# Patient Record
Sex: MALE | Race: BLACK OR AFRICAN AMERICAN | NOT HISPANIC OR LATINO | Employment: FULL TIME | URBAN - METROPOLITAN AREA
[De-identification: names, ages, dates, MRNs, and addresses within clinical notes are randomized per-mention and may not be internally consistent; named-entity substitution may affect disease eponyms.]

---

## 2017-03-17 ENCOUNTER — HOSPITAL ENCOUNTER (EMERGENCY)
Facility: HOSPITAL | Age: 27
Discharge: HOME | End: 2017-03-17
Attending: EMERGENCY MEDICINE

## 2017-03-17 VITALS
HEIGHT: 70 IN | SYSTOLIC BLOOD PRESSURE: 106 MMHG | TEMPERATURE: 98 F | RESPIRATION RATE: 16 BRPM | BODY MASS INDEX: 20.04 KG/M2 | HEART RATE: 77 BPM | WEIGHT: 140 LBS | DIASTOLIC BLOOD PRESSURE: 61 MMHG | OXYGEN SATURATION: 98 %

## 2017-03-17 DIAGNOSIS — B34.9 VIRAL SYNDROME: Primary | ICD-10-CM

## 2017-03-17 PROCEDURE — 99282 EMERGENCY DEPT VISIT SF MDM: CPT | Mod: ,,, | Performed by: PHYSICIAN ASSISTANT

## 2017-03-17 PROCEDURE — 99283 EMERGENCY DEPT VISIT LOW MDM: CPT

## 2017-03-17 NOTE — DISCHARGE INSTRUCTIONS
Rest.  Drink plenty of fluids.  Tylenol or Ibuprofen as directed as needed for fever/pain.  Return to the ED for any new or worsening symptoms.

## 2017-03-17 NOTE — ED PROVIDER NOTES
Encounter Date: 3/17/2017    SCRIBE #1 NOTE: I, Scarlett Desai, am scribing for, and in the presence of,  Dr. Mei. I have scribed the following portions of the note - the APC attestation.       History     Chief Complaint   Patient presents with    Generalized Body Aches     Review of patient's allergies indicates:  No Known Allergies  HPI Comments: Time seen by provider: 5:00 PM    Disclaimer: This note has been generated using voice-recognition software. There may be typographical errors that have been missed during proof-reading.    This is a 26-year-old black male with no significant past medical history presents the ER with a chief complaint of fever, chills, body aches, sinus congestion and cough.  Patient states his symptoms began approximately 4 days ago.  He has been using over-the-counter medications with some improvement of his symptoms.  Patient states he missed some work and is here for return to work excuse.    The history is provided by the patient.     Past Medical History:   Diagnosis Date    Ankle fracture     bilateral     Past Surgical History:   Procedure Laterality Date    NONE N/A 2/9/2016     Family History   Problem Relation Age of Onset    Hypertension Mother     Cancer Maternal Aunt     Diabetes Maternal Aunt     Hypertension Maternal Uncle     Diabetes Paternal Aunt     Heart disease Neg Hx     Stroke Neg Hx      Social History   Substance Use Topics    Smoking status: Current Every Day Smoker     Packs/day: 0.50     Years: 6.00     Types: Cigarettes    Smokeless tobacco: None    Alcohol use Yes      Comment: once per week     Review of Systems   Constitutional: Positive for chills and fever.   HENT: Positive for congestion.    Respiratory: Positive for cough. Negative for shortness of breath.    Cardiovascular: Negative for chest pain.   Gastrointestinal: Negative for nausea and vomiting.   Musculoskeletal: Positive for myalgias. Negative for back pain, neck pain and  neck stiffness.   Skin: Negative for rash and wound.   Neurological: Negative for weakness and numbness.   Psychiatric/Behavioral: Negative for confusion.       Physical Exam   Initial Vitals   BP Pulse Resp Temp SpO2   03/17/17 1502 03/17/17 1502 03/17/17 1502 03/17/17 1502 03/17/17 1502   106/61 77 16 98.3 °F (36.8 °C) 98 %     Physical Exam    Nursing note and vitals reviewed.  Constitutional: He appears well-developed and well-nourished. No distress.   HENT:   Head: Normocephalic and atraumatic.   Right Ear: External ear normal.   Left Ear: External ear normal.   Nose: Nose normal.   Eyes: Conjunctivae are normal.   Neck: Normal range of motion. Neck supple.   Cardiovascular: Normal rate and regular rhythm. Exam reveals no gallop and no friction rub.    No murmur heard.  Pulmonary/Chest: Breath sounds normal. He has no wheezes. He has no rhonchi. He has no rales.   Musculoskeletal: Normal range of motion.   Neurological: He is alert and oriented to person, place, and time.   Skin: Skin is warm and dry. No rash noted. No erythema.   Psychiatric: He has a normal mood and affect. His behavior is normal. Judgment and thought content normal.         ED Course   Procedures  Labs Reviewed - No data to display          Medical Decision Making:   History:   Old Medical Records: I decided to obtain old medical records.  ED Management:  Patient presented to the ER with complaint of fever, body aches, sinus congestion and myalgias that are improving for the last 4 days.  Patient states that he is really here for return to work note.  He has been using over-the-counter medications, which are helping.  He denies any new symptoms today.  Patient's exam is benign.  I do not feel workup is necessary in the emergency room.  He was discharged to continue taking over-the-counter medications as directed.  He has been instructed to followup with his PCP within the next week if symptoms not improving.  He should return to the ER for  any new or worsening symptoms.  This case was discussed with my supervising physician.            Scribe Attestation:   Scribe #1: I performed the above scribed service and the documentation accurately describes the services I performed. I attest to the accuracy of the note.    Attending Attestation:     Physician Attestation Statement for NP/PA:   I discussed this assessment and plan of this patient with the NP/PA, but I did not personally examine the patient. The face to face encounter was performed by the NP/PA.    Other NP/PA Attestation Additions:    History of Present Illness: 26 y.o. male with viral syndrome.          Physician Attestation for Scribe:  Physician Attestation Statement for Scribe #1: I, Dr. Mei, reviewed documentation, as scribed by Scarlett Desai in my presence, and it is both accurate and complete.                 ED Course     Clinical Impression:   The encounter diagnosis was Viral syndrome.          Jaylin Bui PA-C  03/17/17 2024

## 2017-03-17 NOTE — ED TRIAGE NOTES
Pt missed a couple of days of work with flu-like symptoms and is in the ER to get a note saying he's okay to return to work

## 2017-03-17 NOTE — ED AVS SNAPSHOT
[facility]  [address]  [facility] [id number]               Kaiden Purcell   3/17/2017  4:41 PM   ED    Description:  Male : 1990   Department:  [facility]           Your Care was Coordinated By:     Provider Role From To    Cecille Mei MD Attending Provider 17 [id number] --    Jaylin Bui PA-C Physician Assistant 17 [id number] --      Reason for Visit     Generalized Body Aches           Diagnoses this Visit        Comments    Viral syndrome    -  Primary       ED Disposition     ED Disposition Condition Comment    Discharge             To Do List           Follow-up Information     Follow up with [facility]. Schedule an appointment as soon as possible for a visit in 1 week.    Specialty:  Internal Medicine    Why:  As needed    Contact information:    [address]  [facility] [id number] [phone]    Additional information:    [facility]      [facility]     [facility] Care Line -  Assistance  Registered nurses in the [facility] provide clinical advisement, health education, appointment booking, and other advisory services.  Call for this free service at [phone].             Medications           Message regarding Medications     Verify the changes and/or additions to your medication regime listed below are the same as discussed with your clinician today.  If any of these changes or additions are incorrect, please notify your healthcare provider.             Verify that the below list of medications is an accurate representation of the medications you are currently taking.  If none reported, the list may be blank. If incorrect, please contact your healthcare provider. Carry this list with you in case of emergency.           Current Medications     ondansetron (ZOFRAN-ODT) 4 MG TbDL Take 1 tablet (4 mg total) by mouth every 8  "(eight) hours as needed.           Clinical Reference Information           Your Vitals Were     BP Pulse Temp Resp Height Weight    106/61 (BP Location: Right arm, Patient Position: Sitting) 77 98.3 °F (36.8 °C) (Oral) 16 5' 10" (1.778 m) 63.5 kg (140 lb)    SpO2 BMI             98% 20.09 kg/m2         Allergies as of 3/17/2017     No Known Allergies      Immunizations Administered on Date of Encounter - 3/17/2017     None      ED Micro, Lab, POCT     None      ED Imaging Orders     None        Discharge Instructions       Rest.  Drink plenty of fluids.  Tylenol or Ibuprofen as directed as needed for fever/pain.  Return to the ED for any new or worsening symptoms.    Discharge References/Attachments     VIRAL SYNDROME (ADULT) (ENGLISH)      [facility] Sign-Up     Activating your [facility] account is as easy as 1-2-3!     1) Visit [email or web address], select Sign Up Now, enter this activation code and your date of birth, then select Next.  [email or web address]  Expires: 5/1/2017  5:04 PM      2) Create a username and password to use when you visit [facility] in the future and select a security question in case you lose your password and select Next.    3) Enter your e-mail address and click Sign Up!    Additional Information  If you have questions, please e-mail [email or web address] or call [phone] to talk to our [facility] staff. Remember, [facility] is NOT to be used for urgent needs. For medical emergencies, dial 911.          [facility] complies with applicable Federal civil rights laws and does not discriminate on the basis of race, color, national origin, age, disability, or sex.        Language Assistance Services     ATTENTION: Language assistance services are available, free of charge. Please call [phone].      ATENCIÓN: Si habla español, tiene a mattson disposición servicios gratuitos de asistencia lingüística. Llame al [phone].     CHÚ Ý: N?u b?n nói Ti?ng Vi?t, có các d?ch v? h? " tr? yuri antony? mi?n phí dành cho b?n. G?i s? [phone].

## 2017-03-17 NOTE — ED NOTES
LOC: The patient is awake, alert and aware of environment with an appropriate affect, the patient is oriented x 3 and speaking appropriately.  APPEARANCE: Patient resting comfortably and in no acute distress, patient is clean and well groomed, patient's clothing is properly fastened.  SKIN: The skin is warm and dry, patient has normal skin turgor and moist mucus membranes, skin intact, no breakdown or brusing noted.  MUSKULOSKELETAL: Patient moving all extremities well, no obvious swelling or deformities noted.  RESPIRATORY: Airway is open and patent, respirations are spontaneous, patient has a normal effort and rate.   NEURO: No neuro deficits. Speech is clear.

## 2017-12-07 ENCOUNTER — HOSPITAL ENCOUNTER (EMERGENCY)
Facility: HOSPITAL | Age: 27
Discharge: HOME | End: 2017-12-07
Attending: FAMILY MEDICINE
Payer: COMMERCIAL

## 2017-12-07 VITALS
RESPIRATION RATE: 16 BRPM | HEIGHT: 70 IN | HEART RATE: 68 BPM | SYSTOLIC BLOOD PRESSURE: 155 MMHG | BODY MASS INDEX: 20.04 KG/M2 | WEIGHT: 140 LBS | TEMPERATURE: 98 F | OXYGEN SATURATION: 100 % | DIASTOLIC BLOOD PRESSURE: 68 MMHG

## 2017-12-07 DIAGNOSIS — A08.4 VIRAL GASTROENTERITIS: Primary | ICD-10-CM

## 2017-12-07 PROCEDURE — 99282 EMERGENCY DEPT VISIT SF MDM: CPT

## 2017-12-07 PROCEDURE — 99282 EMERGENCY DEPT VISIT SF MDM: CPT | Mod: ,,, | Performed by: FAMILY MEDICINE

## 2017-12-07 NOTE — ED PROVIDER NOTES
Encounter Date: 12/7/2017    SCRIBE #1 NOTE: I, Diana Cespedes, am scribing for, and in the presence of,  Dr. Alvarez. I have scribed the following portions of the note - Other sections scribed: HPI, ROS, and PE.       History     Chief Complaint   Patient presents with    Letter for School/Work     finished with nvd, needing work note     Time patient was seen by the provider: 3:23 PM      The patient is a 27 y.o. male with no past medical history that presents to the ED with a complaint of needing an excuse to return to work. He states two days ago he had a stomach virus and was unable to attend work. He is now feeling better and able to retain PO intake. Patient states that he is going back to work today. He denies fever or wanting any medications.       The history is provided by the patient.     Review of patient's allergies indicates:  No Known Allergies  Past Medical History:   Diagnosis Date    Ankle fracture     bilateral     Past Surgical History:   Procedure Laterality Date    NONE N/A 2/9/2016     Family History   Problem Relation Age of Onset    Hypertension Mother     Cancer Maternal Aunt     Diabetes Maternal Aunt     Hypertension Maternal Uncle     Diabetes Paternal Aunt     Heart disease Neg Hx     Stroke Neg Hx      Social History   Substance Use Topics    Smoking status: Current Every Day Smoker     Packs/day: 0.50     Years: 6.00     Types: Cigarettes    Smokeless tobacco: Not on file    Alcohol use Yes      Comment: once per week     Review of Systems   Constitutional: Negative for fever.       Physical Exam     Initial Vitals [12/07/17 1358]   BP Pulse Resp Temp SpO2   (!) 155/68 68 16 98.2 °F (36.8 °C) 100 %      MAP       97         Physical Exam    Constitutional: No distress.   HENT:   Mucous membranes moist.    Cardiovascular: Normal rate, regular rhythm and normal heart sounds.   Pulmonary/Chest: Breath sounds normal. No respiratory distress.   Neurological: He is alert and  oriented to person, place, and time.   No acute neurological deficits.          ED Course   Procedures  Labs Reviewed - No data to display          Medical Decision Making:   History:   Old Medical Records: I decided to obtain old medical records.  ED Management:  Patient recovered from viral gastroenteritis.  Stable for discharge                   ED Course      Clinical Impression:   The encounter diagnosis was Viral gastroenteritis.    Disposition:   Disposition: Discharged  Condition: Stable                        Vargas Alvarez MD  12/07/17 2270

## 2017-12-07 NOTE — ED TRIAGE NOTES
Patient states he needs a work excuse for the 5th due to stomach virus. States he was too sick to get here.

## 2017-12-07 NOTE — ED NOTES
Patient identifiers verified and correct for Mr Purcell  C/C: Work excuse  APPEARANCE: awake and alert in NAD.  SKIN: warm, dry and intact. No breakdown or bruising.  MUSCULOSKELETAL: Patient moving all extremities spontaneously, no obvious swelling or deformities noted. Ambulates independently.  RESPIRATORY: Denies shortness of breath.Respirations unlabored.   CARDIAC: Denies CP, 2+ distal pulses; no peripheral edema  ABDOMEN: S/ND/NT, Denies nausea  : voids spontaneously, denies difficulty  Neurologic: AAO x 4; follows commands equal strength in all extremities; denies numbness/tingling. Denies dizziness

## 2018-07-11 ENCOUNTER — HOSPITAL ENCOUNTER (EMERGENCY)
Facility: HOSPITAL | Age: 28
Discharge: HOME | End: 2018-07-11
Attending: EMERGENCY MEDICINE
Payer: COMMERCIAL

## 2018-07-11 VITALS
OXYGEN SATURATION: 100 % | TEMPERATURE: 98 F | HEART RATE: 53 BPM | BODY MASS INDEX: 20.04 KG/M2 | WEIGHT: 140 LBS | DIASTOLIC BLOOD PRESSURE: 56 MMHG | SYSTOLIC BLOOD PRESSURE: 122 MMHG | HEIGHT: 70 IN | RESPIRATION RATE: 17 BRPM

## 2018-07-11 DIAGNOSIS — R53.1 WEAKNESS: Primary | ICD-10-CM

## 2018-07-11 LAB
BUN SERPL-MCNC: 15 MG/DL (ref 6–30)
CHLORIDE SERPL-SCNC: 102 MMOL/L (ref 95–110)
CREAT SERPL-MCNC: 1.1 MG/DL (ref 0.5–1.4)
GLUCOSE SERPL-MCNC: 83 MG/DL (ref 70–110)
HCT VFR BLD CALC: 45 %PCV (ref 36–54)
POC IONIZED CALCIUM: 1.11 MMOL/L (ref 1.06–1.42)
POC TCO2 (MEASURED): 29 MMOL/L (ref 23–29)
POTASSIUM BLD-SCNC: 4.4 MMOL/L (ref 3.5–5.1)
SAMPLE: NORMAL
SODIUM BLD-SCNC: 140 MMOL/L (ref 136–145)

## 2018-07-11 PROCEDURE — 25000003 PHARM REV CODE 250: Performed by: EMERGENCY MEDICINE

## 2018-07-11 PROCEDURE — 96360 HYDRATION IV INFUSION INIT: CPT

## 2018-07-11 PROCEDURE — 99283 EMERGENCY DEPT VISIT LOW MDM: CPT | Mod: 25

## 2018-07-11 PROCEDURE — 99283 EMERGENCY DEPT VISIT LOW MDM: CPT | Mod: ,,, | Performed by: EMERGENCY MEDICINE

## 2018-07-11 RX ADMIN — SODIUM CHLORIDE 1000 ML: 0.9 INJECTION, SOLUTION INTRAVENOUS at 12:07

## 2018-07-11 NOTE — ED NOTES
Patient identifiers verified and correct for Mr Purcell  C/C: Gen weakness, diarrhea x 3 over 3 days  APPEARANCE: awake and alert in NAD.  SKIN: warm, dry and intact. No breakdown or bruising.  MUSCULOSKELETAL: Patient moving all extremities spontaneously, no obvious swelling or deformities noted. Ambulates independently.  RESPIRATORY: Denies shortness of breath.Respirations unlabored.   CARDIAC: Denies CP, 2+ distal pulses; no peripheral edema  ABDOMEN: S/ND/NT, Denies nausea, Diarrhea daily  : voids spontaneously, denies difficulty  Neurologic: AAO x 4; follows commands equal strength in all extremities; denies numbness/tingling. Denies dizziness Positive gen weakness, denies headache

## 2018-07-11 NOTE — ED TRIAGE NOTES
"Patient states he is "restless" and tired x 3 days, states same symptoms 1 year ago and was told he was "dehydrated" Denies vomiting, diarrhea x 3 over 3 days, Main concern of fatigue and inability to sleep.   "

## 2018-07-11 NOTE — ED PROVIDER NOTES
Encounter Date: 7/11/2018    SCRIBE #1 NOTE: I, Scarlett Desai, am scribing for, and in the presence of,  Dr. Ching. I have scribed the entire note.       History     Chief Complaint   Patient presents with    Dehydration     Pt presented to the ED via pov. Pt c/o deyhradation. Pt alert. 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 Pt states he needs fluids.      Time seen by provider: 11:54 AM    This is a 27 y.o. male with no medical problems who presents with complaint of generalized fatigue and SOB for the past 3 days. 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 He has had similar symptoms before and states he was dehydrated and needed IV fluids. He also endorses headache and diarrhea. He denies fever, chills, nausea, vomiting, chest pain, weight gain, and sick contact.       The history is provided by the patient and medical records.     Review of patient's allergies indicates:  No Known Allergies  Past Medical History:   Diagnosis Date    Ankle fracture     bilateral    Gastroenteritis      Past Surgical History:   Procedure Laterality Date    NONE N/A 2/9/2016     Family History   Problem Relation Age of Onset    Hypertension Mother     Cancer Maternal Aunt     Diabetes Maternal Aunt     Hypertension Maternal Uncle     Diabetes Paternal Aunt     Heart disease Neg Hx     Stroke Neg Hx      Social History   Substance Use Topics    Smoking status: Current Every Day Smoker     Packs/day: 0.50     Years: 6.00     Types: Cigarettes    Smokeless tobacco: Never Used    Alcohol use Yes      Comment: once per week, none recently     Review of Systems   Constitutional: Positive for fatigue. Negative for chills and fever.   HENT: Negative for nosebleeds.    Eyes: Negative for visual disturbance.   Respiratory: Positive for shortness of breath.    Cardiovascular: Negative for chest pain.   Gastrointestinal: Positive for diarrhea. Negative for nausea and  vomiting.   Genitourinary: Negative for dysuria.   Musculoskeletal: Negative for back pain.   Skin: Negative for rash.   Neurological: Positive for headaches.       Physical Exam     Initial Vitals [07/11/18 1117]   BP Pulse Resp Temp SpO2   116/65 67 17 98.4 °F (36.9 °C) 98 %      MAP       --         Physical Exam    Nursing note and vitals reviewed.  Constitutional: He appears well-developed and well-nourished. No distress.   HENT:   Head: Normocephalic and atraumatic.   Nose: Nose normal.   Mouth/Throat: Oropharynx is clear and moist.   Eyes: Conjunctivae and EOM are normal. Right eye exhibits no discharge. Left eye exhibits no discharge.   Neck: Normal range of motion. Neck supple. No JVD present.   Cardiovascular: Normal rate and normal heart sounds.   No murmur heard.  Pulmonary/Chest: No stridor. No respiratory distress. He exhibits no tenderness.   Abdominal: He exhibits no distension.   Musculoskeletal: Normal range of motion. He exhibits no edema or tenderness.   Lymphadenopathy:     He has no cervical adenopathy.   Neurological: He is alert and oriented to person, place, and time. He has normal strength. No cranial nerve deficit or sensory deficit.   Skin: Skin is warm and dry.   Psychiatric: He has a normal mood and affect. His behavior is normal.         ED Course   Procedures  Labs Reviewed   ISTAT PROCEDURE          Imaging Results    None          Medical Decision Making:   History:   Old Medical Records: I decided to obtain old medical records.  Clinical Tests:   Lab Tests: Ordered and Reviewed            Scribe Attestation:   Scribe #1: I performed the above scribed service and the documentation accurately describes the services I performed. I attest to the accuracy of the note.               Clinical Impression:   The encounter diagnosis was Weakness.      Disposition:   Disposition: Discharged  Condition: Stable                        Dudley Villatoro MD  07/15/18 6925

## 2018-11-10 ENCOUNTER — HOSPITAL ENCOUNTER (EMERGENCY)
Facility: HOSPITAL | Age: 28
Discharge: HOME | End: 2018-11-10
Attending: EMERGENCY MEDICINE

## 2018-11-10 VITALS
RESPIRATION RATE: 16 BRPM | SYSTOLIC BLOOD PRESSURE: 122 MMHG | HEIGHT: 70 IN | HEART RATE: 81 BPM | BODY MASS INDEX: 21.47 KG/M2 | DIASTOLIC BLOOD PRESSURE: 73 MMHG | TEMPERATURE: 99 F | WEIGHT: 150 LBS

## 2018-11-10 DIAGNOSIS — B34.9 VIRAL SYNDROME: Primary | ICD-10-CM

## 2018-11-10 PROCEDURE — 99283 EMERGENCY DEPT VISIT LOW MDM: CPT

## 2018-11-10 PROCEDURE — 99282 EMERGENCY DEPT VISIT SF MDM: CPT | Mod: ,,, | Performed by: EMERGENCY MEDICINE

## 2018-11-11 NOTE — DISCHARGE INSTRUCTIONS
Call one of the clinics below to schedule an appointment to obtain a primary care doctor.  Return to the emergency department if they have any new, recurring, or worrisome symptoms.

## 2018-11-11 NOTE — ED PROVIDER NOTES
Encounter Date: 11/10/2018    SCRIBE #1 NOTE: I, Kinjal Rollins, am scribing for, and in the presence of,  Dr Caballero. I have scribed the entire note.       History     Chief Complaint   Patient presents with    Fever     Time patient was seen by the provider: 6:57 PM      The patient is a 28 y.o. male with no known co-morbidities including: who presents to the ED with a complaint of moderate rhinorrhea. Pt notes that he had congestion, fatigue, and nausea 3 days ago which have now resolved. Pt states that he is not in any pain and is requesting a note for work. Denies fevers, CP, SOB, or abd pain. States his main reason to come in was for the note, that he feels fine now .         The history is provided by the patient.     Review of patient's allergies indicates:  No Known Allergies  Past Medical History:   Diagnosis Date    Ankle fracture     bilateral    Gastroenteritis      Past Surgical History:   Procedure Laterality Date    NONE N/A 2/9/2016     Family History   Problem Relation Age of Onset    Hypertension Mother     Cancer Maternal Aunt     Diabetes Maternal Aunt     Hypertension Maternal Uncle     Diabetes Paternal Aunt     Heart disease Neg Hx     Stroke Neg Hx      Social History     Tobacco Use    Smoking status: Current Every Day Smoker     Packs/day: 0.50     Years: 6.00     Pack years: 3.00     Types: Cigarettes    Smokeless tobacco: Never Used   Substance Use Topics    Alcohol use: Yes     Comment: once per week, none recently    Drug use: No     Comment: 5 cigarettes per day     Review of Systems     Constitutional:  No Fever, No Chills,   Eyes: No Vision Changes  ENT/Mouth: No sore throat. Rhinorrhea.   Cardiovascular:  No Chest Pain, No Palpitations  Respiratory:  No Cough, No SOB  Gastrointestinal:  No Nausea, No Vomiting, No Diarrhea, No abdo pain.  Genitourinary:  No  pain, No dysuria   Musculoskeletal:  No Arthralgias, No Back Pain, No Neck Pain, No recent trauma.  Skin:  No skin  Lesions  Neuro:  No Weakness, No Numbness, No Paresthesias, No Dizziness, No Headache        Physical Exam     Initial Vitals [11/10/18 1820]   BP Pulse Resp Temp SpO2   122/73 81 16 98.6 °F (37 °C) --      MAP       --         Physical Exam     Physical Exam:  GENERAL APPEARANCE: Well developed, well nourished, in no acute distress.  HENT: Normocephalic, atraumatic. Throat with mild erythema, no exudates. Tonsils not swollen. There is nasal congestion    EYES: Sclerae anicteric   NECK: Supple, no thyroid enlargement. Small cervical lymphadenopathy.   CARDIOVASCULAR: Regular rate and rhythm without any murmurs, gallops, rubs.  LUNGS: Speaking in full sentences. Breathing comfortably. Auscultation of the lungs revealed normal breath sounds b/l  ABDOMEN: Soft and nontender, no masses, no rebound or guarding   NEUROLOGIC: Alert, interacting normally. No facial droop.   MSK: Moving all four extremities.  Skin: Warm and dry. No visible rash on exposed areas of skin.    Psych: Mood and affect normal.       ED Course   Procedures  Labs Reviewed - No data to display       Imaging Results    None          Medical Decision Making:   History:   Old Medical Records: I decided to obtain old medical records.  Initial Assessment:   28-year-old male with no significant past medical history comes in with symptoms consistent with viral syndrome, resolving.  Exam is benign with some mild rhinorrhea mild throat erythema and small lymphadenopathy.   Differential Diagnosis:   URI, viral syndrome, common cold, pharyngitis  ED Management:  Patient states that his symptoms are resolved, that he would only like to note that he may return to work.  No provided say that he may return to work.  Risk of further workup or imaging at this time outweighs benefits.  Follow-up outpatient as needed.  ED return precautions for any new, worsening or worrisome symptoms.    MDM Complexity Points:   Problem Points:  New problem, with no additional work-up  planned (maximum of 1)    Data Points:  Decision to obtain old records (in the EHR)    Risk:  Minimal Risk              Scribe Attestation:   Scribe #1: I performed the above scribed service and the documentation accurately describes the services I performed. I attest to the accuracy of the note.               Clinical Impression:   The encounter diagnosis was Viral syndrome.      Disposition:   Disposition: Discharged  Condition: Stable                        Bill Caballero MD  11/11/18 1711

## 2018-11-11 NOTE — ED NOTES
LOC: The patient is awake, alert, and aware of environment. The patient is oriented x 3 and speaking appropriately.   APPEARANCE: No acute distress noted.   PSYCHOSOCIAL: Patient is calm and cooperative.   SKIN: The skin is warm, dry.   RESPIRATORY: Airway is open and patent. Bilateral chest rise and fall. Respirations are spontaneous, even and unlabored. Normal effort and rate noted. No accessory muscle use noted.   CARDIAC: Patient has a normal rate and rhythm. Denies chest pain or SOB.   ABDOMEN: Soft and non tender to palpation. No distention noted.   URINARY:  Voids independently.   EXTREMITIES: No swelling noted.   NEUROLOGIC: Eyes open spontaneously. Speech clear. Tolerating saliva secretions well. Able to follow commands, demonstrating ability to actively and appropriately communicate within context of current conversation. Symmetrical facial muscles. Moving all extremities well. Movement is purposeful.   MUSCULOSKELETAL: No obvious deformities noted.

## 2018-11-11 NOTE — ED TRIAGE NOTES
"Patient reports he was feeling "really really under the weather" last night, and so he called into work. Reports he is feeling better now and wants a work excuse.   "

## 2020-09-02 ENCOUNTER — HOSPITAL ENCOUNTER (OUTPATIENT)
Facility: HOSPITAL | Age: 30
Discharge: HOME | End: 2020-09-03
Attending: EMERGENCY MEDICINE | Admitting: SURGERY

## 2020-09-02 DIAGNOSIS — T71.193A ASSAULT BY MANUAL STRANGULATION: Primary | ICD-10-CM

## 2020-09-02 DIAGNOSIS — S01.81XA FACIAL LACERATION, INITIAL ENCOUNTER: ICD-10-CM
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
## 2020-09-02 DIAGNOSIS — S00.83XA CONTUSION OF FACE, INITIAL ENCOUNTER: ICD-10-CM

## 2020-09-02 LAB
ALBUMIN SERPL BCP-MCNC: 4.4 G/DL (ref 3.5–5.2)
ALP SERPL-CCNC: 101 U/L (ref 55–135)
ALT SERPL W/O P-5'-P-CCNC: 27 U/L (ref 10–44)
ANION GAP SERPL CALC-SCNC: 11 MMOL/L (ref 8–16)
AST SERPL-CCNC: 59 U/L (ref 10–40)
BASOPHILS # BLD AUTO: 0.06 K/UL (ref 0–0.2)
BASOPHILS NFR BLD: 0.4 % (ref 0–1.9)
BILIRUB SERPL-MCNC: 0.4 MG/DL (ref 0.1–1)
BUN SERPL-MCNC: 21 MG/DL (ref 6–20)
CALCIUM SERPL-MCNC: 9.6 MG/DL (ref 8.7–10.5)
CHLORIDE SERPL-SCNC: 106 MMOL/L (ref 95–110)
CK SERPL-CCNC: 3551 U/L (ref 20–200)
CO2 SERPL-SCNC: 23 MMOL/L (ref 23–29)
CREAT SERPL-MCNC: 1.4 MG/DL (ref 0.5–1.4)
DIFFERENTIAL METHOD: ABNORMAL
EOSINOPHIL # BLD AUTO: 0 K/UL (ref 0–0.5)
EOSINOPHIL NFR BLD: 0.3 % (ref 0–8)
ERYTHROCYTE [DISTWIDTH] IN BLOOD BY AUTOMATED COUNT: 12.1 % (ref 11.5–14.5)
EST. GFR  (AFRICAN AMERICAN): >60 ML/MIN/1.73 M^2
EST. GFR  (NON AFRICAN AMERICAN): >60 ML/MIN/1.73 M^2
GLUCOSE SERPL-MCNC: 106 MG/DL (ref 70–110)
HCT VFR BLD AUTO: 44.7 % (ref 40–54)
HGB BLD-MCNC: 14.7 G/DL (ref 14–18)
IMM GRANULOCYTES # BLD AUTO: 0.03 K/UL (ref 0–0.04)
IMM GRANULOCYTES NFR BLD AUTO: 0.2 % (ref 0–0.5)
LYMPHOCYTES # BLD AUTO: 1.8 K/UL (ref 1–4.8)
LYMPHOCYTES NFR BLD: 12.9 % (ref 18–48)
MCH RBC QN AUTO: 30.6 PG (ref 27–31)
MCHC RBC AUTO-ENTMCNC: 32.9 G/DL (ref 32–36)
MCV RBC AUTO: 93 FL (ref 82–98)
MONOCYTES # BLD AUTO: 0.9 K/UL (ref 0.3–1)
MONOCYTES NFR BLD: 6.6 % (ref 4–15)
NEUTROPHILS # BLD AUTO: 10.8 K/UL (ref 1.8–7.7)
NEUTROPHILS NFR BLD: 79.6 % (ref 38–73)
NRBC BLD-RTO: 0 /100 WBC
PLATELET # BLD AUTO: 204 K/UL (ref 150–350)
PMV BLD AUTO: 9.8 FL (ref 9.2–12.9)
POTASSIUM SERPL-SCNC: 3.7 MMOL/L (ref 3.5–5.1)
PROT SERPL-MCNC: 7.1 G/DL (ref 6–8.4)
RBC # BLD AUTO: 4.81 M/UL (ref 4.6–6.2)
SODIUM SERPL-SCNC: 140 MMOL/L (ref 136–145)
WBC # BLD AUTO: 13.55 K/UL (ref 3.9–12.7)

## 2020-09-02 PROCEDURE — 25000003 PHARM REV CODE 250: Performed by: STUDENT IN AN ORGANIZED HEALTH CARE EDUCATION/TRAINING PROGRAM

## 2020-09-02 PROCEDURE — 80047 BASIC METABLC PNL IONIZED CA: CPT

## 2020-09-02 PROCEDURE — 25500020 PHARM REV CODE 255: Performed by: EMERGENCY MEDICINE

## 2020-09-02 PROCEDURE — 80053 COMPREHEN METABOLIC PANEL: CPT

## 2020-09-02 PROCEDURE — 12011 PR RESUPERF WND FACE <2.5 CM: ICD-10-PCS | Mod: ,,, | Performed by: EMERGENCY MEDICINE

## 2020-09-02 PROCEDURE — 84100 ASSAY OF PHOSPHORUS: CPT

## 2020-09-02 PROCEDURE — 93005 ELECTROCARDIOGRAM TRACING: CPT

## 2020-09-02 PROCEDURE — 82550 ASSAY OF CK (CPK): CPT

## 2020-09-02 PROCEDURE — 93010 EKG 12-LEAD: ICD-10-PCS | Mod: ,,, | Performed by: INTERNAL MEDICINE

## 2020-09-02 PROCEDURE — 12011 RPR F/E/E/N/L/M 2.5 CM/<: CPT | Mod: ,,, | Performed by: EMERGENCY MEDICINE

## 2020-09-02 PROCEDURE — 93010 ELECTROCARDIOGRAM REPORT: CPT | Mod: ,,, | Performed by: INTERNAL MEDICINE

## 2020-09-02 PROCEDURE — 12011 RPR F/E/E/N/L/M 2.5 CM/<: CPT

## 2020-09-02 PROCEDURE — 99284 PR EMERGENCY DEPT VISIT,LEVEL IV: ICD-10-PCS | Mod: 25,,, | Performed by: EMERGENCY MEDICINE

## 2020-09-02 PROCEDURE — 90715 TDAP VACCINE 7 YRS/> IM: CPT | Performed by: EMERGENCY MEDICINE

## 2020-09-02 PROCEDURE — 63600175 PHARM REV CODE 636 W HCPCS: Performed by: EMERGENCY MEDICINE

## 2020-09-02 PROCEDURE — 99284 EMERGENCY DEPT VISIT MOD MDM: CPT | Mod: 25,,, | Performed by: EMERGENCY MEDICINE

## 2020-09-02 PROCEDURE — 99285 EMERGENCY DEPT VISIT HI MDM: CPT | Mod: 25

## 2020-09-02 PROCEDURE — 90471 IMMUNIZATION ADMIN: CPT | Performed by: EMERGENCY MEDICINE

## 2020-09-02 PROCEDURE — 96374 THER/PROPH/DIAG INJ IV PUSH: CPT

## 2020-09-02 PROCEDURE — 85025 COMPLETE CBC W/AUTO DIFF WBC: CPT

## 2020-09-02 PROCEDURE — 63600175 PHARM REV CODE 636 W HCPCS: Performed by: STUDENT IN AN ORGANIZED HEALTH CARE EDUCATION/TRAINING PROGRAM

## 2020-09-02 RX ORDER — LIDOCAINE HYDROCHLORIDE AND EPINEPHRINE 10; 10 MG/ML; UG/ML
10 INJECTION, SOLUTION INFILTRATION; PERINEURAL ONCE
Status: DISCONTINUED | OUTPATIENT
Start: 2020-09-03 | End: 2020-09-03

## 2020-09-02 RX ORDER — MORPHINE SULFATE 4 MG/ML
4 INJECTION, SOLUTION INTRAMUSCULAR; INTRAVENOUS
Status: COMPLETED | OUTPATIENT
Start: 2020-09-02 | End: 2020-09-02

## 2020-09-02 RX ADMIN — IOHEXOL 75 ML: 350 INJECTION, SOLUTION INTRAVENOUS at 10:09

## 2020-09-02 RX ADMIN — MORPHINE SULFATE 4 MG: 4 INJECTION INTRAVENOUS at 11:09

## 2020-09-02 RX ADMIN — CLOSTRIDIUM TETANI TOXOID ANTIGEN (FORMALDEHYDE INACTIVATED), CORYNEBACTERIUM DIPHTHERIAE TOXOID ANTIGEN (FORMALDEHYDE INACTIVATED), BORDETELLA PERTUSSIS TOXOID ANTIGEN (GLUTARALDEHYDE INACTIVATED), BORDETELLA PERTUSSIS FILAMENTOUS HEMAGGLUTININ ANTIGEN (FORMALDEHYDE INACTIVATED), BORDETELLA PERTUSSIS PERTACTIN ANTIGEN, AND BORDETELLA PERTUSSIS FIMBRIAE 2/3 ANTIGEN 0.5 ML: 5; 2; 2.5; 5; 3; 5 INJECTION, SUSPENSION INTRAMUSCULAR at 09:09

## 2020-09-02 RX ADMIN — SODIUM CHLORIDE 1000 ML: 0.9 INJECTION, SOLUTION INTRAVENOUS at 11:09

## 2020-09-03 VITALS
TEMPERATURE: 97 F | DIASTOLIC BLOOD PRESSURE: 64 MMHG | OXYGEN SATURATION: 100 % | HEART RATE: 53 BPM | WEIGHT: 140 LBS | BODY MASS INDEX: 19.6 KG/M2 | SYSTOLIC BLOOD PRESSURE: 108 MMHG | HEIGHT: 71 IN | RESPIRATION RATE: 16 BRPM
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
## 2020-09-03 LAB
ANION GAP SERPL CALC-SCNC: 8 MMOL/L (ref 8–16)
BASOPHILS # BLD AUTO: 0.05 K/UL (ref 0–0.2)
BASOPHILS NFR BLD: 0.4 % (ref 0–1.9)
BUN SERPL-MCNC: 15 MG/DL (ref 6–20)
BUN SERPL-MCNC: 29 MG/DL (ref 6–30)
CALCIUM SERPL-MCNC: 8.6 MG/DL (ref 8.7–10.5)
CHLORIDE SERPL-SCNC: 108 MMOL/L (ref 95–110)
CHLORIDE SERPL-SCNC: 110 MMOL/L (ref 95–110)
CK SERPL-CCNC: 4301 U/L (ref 20–200)
CO2 SERPL-SCNC: 22 MMOL/L (ref 23–29)
CREAT SERPL-MCNC: 1.1 MG/DL (ref 0.5–1.4)
CREAT SERPL-MCNC: 1.3 MG/DL (ref 0.5–1.4)
DIFFERENTIAL METHOD: ABNORMAL
EOSINOPHIL # BLD AUTO: 0.1 K/UL (ref 0–0.5)
EOSINOPHIL NFR BLD: 0.5 % (ref 0–8)
ERYTHROCYTE [DISTWIDTH] IN BLOOD BY AUTOMATED COUNT: 12.3 % (ref 11.5–14.5)
EST. GFR  (AFRICAN AMERICAN): >60 ML/MIN/1.73 M^2
EST. GFR  (NON AFRICAN AMERICAN): >60 ML/MIN/1.73 M^2
GLUCOSE SERPL-MCNC: 111 MG/DL (ref 70–110)
GLUCOSE SERPL-MCNC: 91 MG/DL (ref 70–110)
HCT VFR BLD AUTO: 42.8 % (ref 40–54)
HCT VFR BLD CALC: 45 %PCV (ref 36–54)
HGB BLD-MCNC: 13.7 G/DL (ref 14–18)
IMM GRANULOCYTES # BLD AUTO: 0.02 K/UL (ref 0–0.04)
IMM GRANULOCYTES NFR BLD AUTO: 0.2 % (ref 0–0.5)
LYMPHOCYTES # BLD AUTO: 2.2 K/UL (ref 1–4.8)
LYMPHOCYTES NFR BLD: 18.5 % (ref 18–48)
MAGNESIUM SERPL-MCNC: 2 MG/DL (ref 1.6–2.6)
MCH RBC QN AUTO: 30.2 PG (ref 27–31)
MCHC RBC AUTO-ENTMCNC: 32 G/DL (ref 32–36)
MCV RBC AUTO: 95 FL (ref 82–98)
MONOCYTES # BLD AUTO: 1 K/UL (ref 0.3–1)
MONOCYTES NFR BLD: 8.3 % (ref 4–15)
NEUTROPHILS # BLD AUTO: 8.6 K/UL (ref 1.8–7.7)
NEUTROPHILS NFR BLD: 72.1 % (ref 38–73)
NRBC BLD-RTO: 0 /100 WBC
PHOSPHATE SERPL-MCNC: 2.2 MG/DL (ref 2.7–4.5)
PLATELET # BLD AUTO: 196 K/UL (ref 150–350)
PMV BLD AUTO: 10.1 FL (ref 9.2–12.9)
POC IONIZED CALCIUM: 0.85 MMOL/L (ref 1.06–1.42)
POC TCO2 (MEASURED): 21 MMOL/L (ref 23–29)
POTASSIUM BLD-SCNC: >9 MMOL/L (ref 3.5–5.1)
POTASSIUM SERPL-SCNC: 3.8 MMOL/L (ref 3.5–5.1)
RBC # BLD AUTO: 4.53 M/UL (ref 4.6–6.2)
SAMPLE: ABNORMAL
SARS-COV-2 RDRP RESP QL NAA+PROBE: NEGATIVE
SODIUM BLD-SCNC: 131 MMOL/L (ref 136–145)
SODIUM SERPL-SCNC: 140 MMOL/L (ref 136–145)
WBC # BLD AUTO: 11.93 K/UL (ref 3.9–12.7)

## 2020-09-03 PROCEDURE — G0378 HOSPITAL OBSERVATION PER HR: HCPCS

## 2020-09-03 PROCEDURE — 99219 PR INITIAL OBSERVATION CARE,LEVL II: CPT | Mod: ,,, | Performed by: SURGERY

## 2020-09-03 PROCEDURE — 99219 PR INITIAL OBSERVATION CARE,LEVL II: ICD-10-PCS | Mod: ,,, | Performed by: SURGERY

## 2020-09-03 PROCEDURE — 25000003 PHARM REV CODE 250: Performed by: STUDENT IN AN ORGANIZED HEALTH CARE EDUCATION/TRAINING PROGRAM

## 2020-09-03 PROCEDURE — 96376 TX/PRO/DX INJ SAME DRUG ADON: CPT

## 2020-09-03 PROCEDURE — 63600175 PHARM REV CODE 636 W HCPCS: Performed by: EMERGENCY MEDICINE

## 2020-09-03 PROCEDURE — U0002 COVID-19 LAB TEST NON-CDC: HCPCS

## 2020-09-03 PROCEDURE — 82550 ASSAY OF CK (CPK): CPT

## 2020-09-03 PROCEDURE — 85025 COMPLETE CBC W/AUTO DIFF WBC: CPT

## 2020-09-03 PROCEDURE — 80048 BASIC METABOLIC PNL TOTAL CA: CPT

## 2020-09-03 PROCEDURE — 83735 ASSAY OF MAGNESIUM: CPT

## 2020-09-03 PROCEDURE — 25000003 PHARM REV CODE 250: Performed by: EMERGENCY MEDICINE

## 2020-09-03 RX ORDER — LIDOCAINE HYDROCHLORIDE AND EPINEPHRINE 10; 10 MG/ML; UG/ML
10 INJECTION, SOLUTION INFILTRATION; PERINEURAL ONCE
Status: COMPLETED | OUTPATIENT
Start: 2020-09-03 | End: 2020-09-03

## 2020-09-03 RX ORDER — ONDANSETRON 8 MG/1
8 TABLET, ORALLY DISINTEGRATING ORAL EVERY 8 HOURS PRN
Status: DISCONTINUED | OUTPATIENT
Start: 2020-09-03 | End: 2020-09-03 | Stop reason: HOSPADM

## 2020-09-03 RX ORDER — TRAMADOL HYDROCHLORIDE 50 MG/1
50 TABLET ORAL EVERY 6 HOURS PRN
Qty: 15 EACH | Refills: 0 | Status: SHIPPED | OUTPATIENT
Start: 2020-09-03 | End: 2022-09-27 | Stop reason: ALTCHOICE

## 2020-09-03 RX ORDER — OXYCODONE HYDROCHLORIDE 5 MG/1
5 TABLET ORAL EVERY 4 HOURS PRN
Status: DISCONTINUED | OUTPATIENT
Start: 2020-09-03 | End: 2020-09-03 | Stop reason: HOSPADM

## 2020-09-03 RX ORDER — SODIUM CHLORIDE 9 MG/ML
INJECTION, SOLUTION INTRAVENOUS CONTINUOUS
Status: DISCONTINUED | OUTPATIENT
Start: 2020-09-03 | End: 2020-09-03

## 2020-09-03 RX ORDER — ACETAMINOPHEN 500 MG
1000 TABLET ORAL EVERY 8 HOURS
Qty: 40 TABLET | Refills: 0 | Status: SHIPPED | OUTPATIENT
Start: 2020-09-03 | End: 2020-09-17

## 2020-09-03 RX ORDER — ACETAMINOPHEN 500 MG
1000 TABLET ORAL EVERY 8 HOURS
Status: DISCONTINUED | OUTPATIENT
Start: 2020-09-03 | End: 2020-09-03 | Stop reason: HOSPADM

## 2020-09-03 RX ORDER — MORPHINE SULFATE 4 MG/ML
4 INJECTION, SOLUTION INTRAMUSCULAR; INTRAVENOUS
Status: COMPLETED | OUTPATIENT
Start: 2020-09-03 | End: 2020-09-03

## 2020-09-03 RX ORDER — LIDOCAINE HYDROCHLORIDE 10 MG/ML
1 INJECTION, SOLUTION EPIDURAL; INFILTRATION; INTRACAUDAL; PERINEURAL ONCE
Status: DISCONTINUED | OUTPATIENT
Start: 2020-09-03 | End: 2020-09-03 | Stop reason: HOSPADM

## 2020-09-03 RX ADMIN — LIDOCAINE HYDROCHLORIDE,EPINEPHRINE BITARTRATE 10 ML: 10; .01 INJECTION, SOLUTION INFILTRATION; PERINEURAL at 12:09

## 2020-09-03 RX ADMIN — OXYCODONE 5 MG: 5 TABLET ORAL at 11:09

## 2020-09-03 RX ADMIN — MORPHINE SULFATE 4 MG: 4 INJECTION INTRAVENOUS at 12:09

## 2020-09-03 RX ADMIN — SODIUM CHLORIDE: 0.9 INJECTION, SOLUTION INTRAVENOUS at 03:09
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
## 2020-09-03 NOTE — PLAN OF CARE
SW completed discharge planning assessment with pt's girl friend at bedside. Patient was sleeping upon assessment. SW verified demographic information listed on the pt.'s Face sheet. SW updated pt's Primary phone #[phone].     As reported pt's does not have a PCP, or insurance. SW will follow up with the pt as it relates to his discharge needs.       09/03/20 0934   Discharge Assessment   Assessment Type Discharge Planning Assessment   Confirmed/corrected address and phone number on facesheet? Yes   Assessment information obtained from? Other  (Pt's Girlfriend)   Expected Length of Stay (days) 1   Communicated expected length of stay with patient/caregiver yes   Prior to hospitilization cognitive status: Alert/Oriented   Prior to hospitalization functional status: Independent   Current cognitive status: Alert/Oriented   Current Functional Status: Independent   Facility Arrived From: N/A   Lives With significant other   Able to Return to Prior Arrangements yes   Is patient able to care for self after discharge? Yes   Who are your caregiver(s) and their phone number(s)? N/A   Patient's perception of discharge disposition home or selfcare   Readmission Within the Last 30 Days no previous admission in last 30 days   Patient currently being followed by outpatient case management? No   Patient currently receives any other outside agency services? No   Equipment Currently Used at Home none   Do you have any problems affording any of your prescribed medications? No   Is the patient taking medications as prescribed? yes   Does the patient have transportation home? Yes   Transportation Anticipated family or friend will provide  (pt's Girlfrient at bedside)   Does the patient receive services at the [facility]? No   Discharge Plan A Home with family   Discharge Plan B Home   DME Needed Upon Discharge  none   Patient/Family in Agreement with Plan yes     Carmina Han LMSW  Case Management   [facility]   Ext. [phone]
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
## 2020-09-03 NOTE — NURSING
Pt oriented to room and unit. Bed in lowest position with siderails up x2. Call bell within reach; pt instructed to call for assistance. Covered both of pts knees and elbows with foam dressing. Started IV fluids. Pt updated with POC.  Will continue to monitor.

## 2020-09-03 NOTE — ED NOTES
I-STAT Chem-8+ Results:   Value Reference Range   Sodium 131 136-145 mmol/L   Potassium  9??? 3.5-5.1 mmol/L   Chloride 108  mmol/L   Ionized Calcium 0.85 1.06-1.42 mmol/L   CO2 (measured) 21 23-29 mmol/L   Glucose 111  mg/dL   BUN 29 6-30 mg/dL   Creatinine 1.3 0.5-1.4 mg/dL   Hematocrit 45 36-54%      Provider notified of results
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
## 2020-09-03 NOTE — NURSING
Pt c/o 8/10 pain and is requesting a stronger prescription pain medication to d/c home with, MD shaw. Pt received and verbalized understanding of AVS and IV has been removed.
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
## 2020-09-03 NOTE — PLAN OF CARE
09/03/20 1540   Final Note   Assessment Type Final Discharge Note   Anticipated Discharge Disposition Home   What phone number can be called within the next 1-3 days to see how you are doing after discharge? [phone]   Discharge plans and expectations educations in teach back method with documentation complete? Yes     Follow-up Information      Shiraz Mirza MD.    Specialty: General Surgery  Why: As needed  Contact information:  [address]  [facility] [id number] [phone]

## 2020-09-03 NOTE — SUBJECTIVE & OBJECTIVE
No current facility-administered medications on file prior to encounter.      No current outpatient medications on file prior to encounter.       Review of patient's allergies indicates:  No Known Allergies    Past Medical History:   Diagnosis Date    Ankle fracture     bilateral    Gastroenteritis      Past Surgical History:   Procedure Laterality Date    NONE N/A 2/9/2016     Family History     Problem Relation (Age of Onset)    Cancer Maternal Aunt    Diabetes Maternal Aunt, Paternal Aunt    Hypertension Mother, Maternal Uncle        Tobacco Use    Smoking status: Current Every Day Smoker     Packs/day: 0.50     Years: 6.00     Pack years: 3.00     Types: Cigarettes    Smokeless tobacco: Never Used   Substance and Sexual Activity    Alcohol use: Not Currently     Comment: once per week, none recently    Drug use: No     Comment: 5 cigarettes per day    Sexual activity: Not on file     Review of Systems   Constitutional: Negative for activity change, appetite change, chills, fatigue and fever.   HENT: Negative for congestion, ear pain, rhinorrhea and sore throat.    Eyes: Negative for pain, discharge and visual disturbance.   Respiratory: Negative for cough, chest tightness and shortness of breath.    Cardiovascular: Negative for chest pain and palpitations.   Gastrointestinal: Negative for abdominal distention, abdominal pain, blood in stool, constipation, diarrhea, nausea and vomiting.   Genitourinary: Negative for difficulty urinating.   Musculoskeletal: Negative for back pain and neck pain.   Skin: Positive for wound. Negative for color change and rash.   Neurological: Positive for weakness (L>R). Negative for dizziness, tremors, facial asymmetry, numbness and headaches.   Hematological: Negative for adenopathy.   Psychiatric/Behavioral: Negative.      Objective:     Vital Signs (Most Recent):  Temp: 98.8 °F (37.1 °C) (09/02/20 2025)  Pulse: 82 (09/02/20 2057)  Resp: 17 (09/02/20 2057)  BP: 130/87  (09/02/20 2057)  SpO2: 100 % (09/02/20 2057) Vital Signs (24h Range):  Temp:  [98.8 °F (37.1 °C)] 98.8 °F (37.1 °C)  Pulse:  [82-90] 82  Resp:  [16-17] 17  SpO2:  [99 %-100 %] 100 %  BP: (124-130)/(80-87) 130/87     Weight: 68 kg (150 lb)  Body mass index is 21.52 kg/m².    Physical Exam  Constitutional:       General: He is not in acute distress.     Appearance: He is well-developed. He is not toxic-appearing.   HENT:      Head: Normocephalic. Abrasion (multiple), contusion and laceration present. No raccoon eyes.      Jaw: No pain on movement.      Comments: Multiple small abrasions  Right supra-orbital swelling and abrasion  Left zygomatic tenderness and swelling      Nose: Nose normal.   Eyes:      General: No scleral icterus.        Right eye: No discharge.         Left eye: No discharge.      Conjunctiva/sclera: Conjunctivae normal.      Pupils: Pupils are equal, round, and reactive to light.   Neck:      Thyroid: No thyromegaly.      Comments: C-collar in place   Cardiovascular:      Rate and Rhythm: Normal rate and regular rhythm.   Pulmonary:      Effort: Pulmonary effort is normal. No respiratory distress.   Abdominal:      General: There is no distension.      Palpations: Abdomen is soft.      Tenderness: There is no abdominal tenderness. There is no guarding or rebound.   Skin:     General: Skin is warm and dry.      Findings: Bruising and lesion present. No rash.   Neurological:      Mental Status: He is alert and oriented to person, place, and time.      Sensory: No sensory deficit.      Motor: Weakness present.      Comments: Left sided weakness 3/5 strength in upper extremity and 4/5 in lower extremity  Right sided weakness per report but 5/5 strength in upper and lower extremities         Significant Labs:  CBC:   Recent Labs   Lab 09/02/20 2106   WBC 13.55*   RBC 4.81   HGB 14.7   HCT 44.7      MCV 93   MCH 30.6   MCHC 32.9     BMP:   Recent Labs   Lab 09/02/20 2106         K  3.7      CO2 23   BUN 21*   CREATININE 1.4   CALCIUM 9.6       Significant Diagnostics:  I have reviewed all pertinent imaging results/findings within the past 24 hours.     CT head, max/fac and CTA neck pending

## 2020-09-04 NOTE — NURSING
Pt verbalized understand of AVS and stated that he wanted to walk out instead of waiting on wheelchair. Pt aware of x prescriptions that need to be picked up from pharmacy.

## 2020-11-16 ENCOUNTER — HOSPITAL ENCOUNTER (EMERGENCY)
Facility: HOSPITAL | Age: 30
Discharge: HOME | End: 2020-11-16
Attending: EMERGENCY MEDICINE
Payer: COMMERCIAL

## 2020-11-16 VITALS
WEIGHT: 140 LBS | DIASTOLIC BLOOD PRESSURE: 81 MMHG | BODY MASS INDEX: 20.04 KG/M2 | RESPIRATION RATE: 16 BRPM | HEART RATE: 63 BPM | OXYGEN SATURATION: 99 % | SYSTOLIC BLOOD PRESSURE: 122 MMHG | HEIGHT: 70 IN | TEMPERATURE: 98 F

## 2020-11-16 DIAGNOSIS — S39.012A STRAIN OF LUMBAR REGION, INITIAL ENCOUNTER: ICD-10-CM
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
## 2020-11-16 PROCEDURE — 25000003 PHARM REV CODE 250: Performed by: EMERGENCY MEDICINE

## 2020-11-16 PROCEDURE — 99284 PR EMERGENCY DEPT VISIT,LEVEL IV: ICD-10-PCS | Mod: ,,, | Performed by: EMERGENCY MEDICINE

## 2020-11-16 PROCEDURE — 99284 EMERGENCY DEPT VISIT MOD MDM: CPT | Mod: ,,, | Performed by: EMERGENCY MEDICINE

## 2020-11-16 PROCEDURE — 99284 EMERGENCY DEPT VISIT MOD MDM: CPT

## 2020-11-16 RX ORDER — METHOCARBAMOL 500 MG/1
500 TABLET, FILM COATED ORAL 3 TIMES DAILY PRN
Qty: 15 TABLET | Refills: 0 | Status: SHIPPED | OUTPATIENT
Start: 2020-11-16 | End: 2020-11-21

## 2020-11-16 RX ORDER — KETOROLAC TROMETHAMINE 10 MG/1
10 TABLET, FILM COATED ORAL
Status: COMPLETED | OUTPATIENT
Start: 2020-11-16 | End: 2020-11-16

## 2020-11-16 RX ORDER — NAPROXEN 375 MG/1
375 TABLET ORAL 2 TIMES DAILY WITH MEALS
Qty: 10 TABLET | Refills: 0 | Status: SHIPPED | OUTPATIENT
Start: 2020-11-16 | End: 2020-11-21

## 2020-11-16 RX ADMIN — KETOROLAC TROMETHAMINE 10 MG: 10 TABLET, FILM COATED ORAL at 08:11

## 2020-11-16 NOTE — Clinical Note
"Kaiden Jerniganduarte Purcell was seen and treated in our emergency department on 11/16/2020.  He may return to work on 11/18/2020.       If you have any questions or concerns, please don't hesitate to call.      Blaine Alcantara, DO"
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
## 2021-01-11 ENCOUNTER — HOSPITAL ENCOUNTER (EMERGENCY)
Facility: HOSPITAL | Age: 31
Discharge: HOME | End: 2021-01-11
Attending: EMERGENCY MEDICINE
Payer: COMMERCIAL

## 2021-01-11 VITALS
TEMPERATURE: 99 F | HEIGHT: 71 IN | RESPIRATION RATE: 18 BRPM | SYSTOLIC BLOOD PRESSURE: 120 MMHG | DIASTOLIC BLOOD PRESSURE: 63 MMHG | OXYGEN SATURATION: 100 % | WEIGHT: 145 LBS | BODY MASS INDEX: 20.3 KG/M2 | HEART RATE: 70 BPM

## 2021-01-11 DIAGNOSIS — K08.89 PAIN, DENTAL: Primary | ICD-10-CM

## 2021-01-11 DIAGNOSIS — R14.0 BLOATING: ICD-10-CM

## 2021-01-11 DIAGNOSIS — G44.309 POST-TRAUMATIC HEADACHE, NOT INTRACTABLE, UNSPECIFIED CHRONICITY PATTERN: ICD-10-CM

## 2021-01-11 LAB
CTP QC/QA: YES
SARS-COV-2 RDRP RESP QL NAA+PROBE: NEGATIVE

## 2021-01-11 PROCEDURE — 99284 EMERGENCY DEPT VISIT MOD MDM: CPT | Mod: 25

## 2021-01-11 PROCEDURE — U0002 COVID-19 LAB TEST NON-CDC: HCPCS | Performed by: EMERGENCY MEDICINE

## 2021-01-11 PROCEDURE — 93010 ELECTROCARDIOGRAM REPORT: CPT | Mod: ,,, | Performed by: INTERNAL MEDICINE

## 2021-01-11 PROCEDURE — 93010 EKG 12-LEAD: ICD-10-PCS | Mod: ,,, | Performed by: INTERNAL MEDICINE

## 2021-01-11 PROCEDURE — 25000003 PHARM REV CODE 250: Performed by: EMERGENCY MEDICINE

## 2021-01-11 PROCEDURE — 99284 PR EMERGENCY DEPT VISIT,LEVEL IV: ICD-10-PCS | Mod: CS,,, | Performed by: EMERGENCY MEDICINE

## 2021-01-11 PROCEDURE — 93005 ELECTROCARDIOGRAM TRACING: CPT

## 2021-01-11 PROCEDURE — 99284 EMERGENCY DEPT VISIT MOD MDM: CPT | Mod: CS,,, | Performed by: EMERGENCY MEDICINE

## 2021-01-11 RX ORDER — CHLORHEXIDINE GLUCONATE ORAL RINSE 1.2 MG/ML
15 SOLUTION DENTAL 2 TIMES DAILY
Qty: 420 ML | Refills: 0 | Status: SHIPPED | OUTPATIENT
Start: 2021-01-11 | End: 2021-01-25

## 2021-01-11 RX ORDER — ACETAMINOPHEN 500 MG
500 TABLET ORAL
Qty: 42 TABLET | Refills: 0 | Status: SHIPPED | OUTPATIENT
Start: 2021-01-11 | End: 2021-01-18

## 2021-01-11 RX ORDER — ACETAMINOPHEN 500 MG
1000 TABLET ORAL
Status: COMPLETED | OUTPATIENT
Start: 2021-01-11 | End: 2021-01-11

## 2021-01-11 RX ORDER — IBUPROFEN 400 MG/1
400 TABLET ORAL EVERY 6 HOURS PRN
Qty: 28 TABLET | Refills: 0 | Status: SHIPPED | OUTPATIENT
Start: 2021-01-11 | End: 2021-01-18

## 2021-01-11 RX ORDER — PENICILLIN V POTASSIUM 500 MG/1
500 TABLET, FILM COATED ORAL 4 TIMES DAILY
Qty: 40 TABLET | Refills: 0 | Status: SHIPPED | OUTPATIENT
Start: 2021-01-11 | End: 2021-01-18

## 2021-01-11 RX ADMIN — ACETAMINOPHEN 1000 MG: 500 TABLET ORAL at 09:01

## 2021-04-11 ENCOUNTER — HOSPITAL ENCOUNTER (OUTPATIENT)
Facility: HOSPITAL | Age: 31
Discharge: HOME | End: 2021-04-12
Attending: EMERGENCY MEDICINE | Admitting: SURGERY

## 2021-04-11 DIAGNOSIS — R00.1 BRADYCARDIA: ICD-10-CM

## 2021-04-11 DIAGNOSIS — S71.131A GUN SHOT WOUND OF THIGH/FEMUR, RIGHT, INITIAL ENCOUNTER: Primary | ICD-10-CM
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
## 2021-04-11 LAB
ABO + RH BLD: NORMAL
ALBUMIN SERPL BCP-MCNC: 4.3 G/DL (ref 3.5–5.2)
ALP SERPL-CCNC: 101 U/L (ref 55–135)
ALT SERPL W/O P-5'-P-CCNC: 17 U/L (ref 10–44)
ANION GAP SERPL CALC-SCNC: 18 MMOL/L (ref 8–16)
AST SERPL-CCNC: 23 U/L (ref 10–40)
BASOPHILS # BLD AUTO: 0.07 K/UL (ref 0–0.2)
BASOPHILS NFR BLD: 0.5 % (ref 0–1.9)
BILIRUB SERPL-MCNC: 0.5 MG/DL (ref 0.1–1)
BLD GP AB SCN CELLS X3 SERPL QL: NORMAL
BUN SERPL-MCNC: 14 MG/DL (ref 6–20)
CALCIUM SERPL-MCNC: 9.6 MG/DL (ref 8.7–10.5)
CHLORIDE SERPL-SCNC: 105 MMOL/L (ref 95–110)
CO2 SERPL-SCNC: 19 MMOL/L (ref 23–29)
CREAT SERPL-MCNC: 1.3 MG/DL (ref 0.5–1.4)
CTP QC/QA: YES
DIFFERENTIAL METHOD: ABNORMAL
EOSINOPHIL # BLD AUTO: 0 K/UL (ref 0–0.5)
EOSINOPHIL NFR BLD: 0.2 % (ref 0–8)
ERYTHROCYTE [DISTWIDTH] IN BLOOD BY AUTOMATED COUNT: 11.8 % (ref 11.5–14.5)
EST. GFR  (AFRICAN AMERICAN): >60 ML/MIN/1.73 M^2
EST. GFR  (NON AFRICAN AMERICAN): >60 ML/MIN/1.73 M^2
ETHANOL SERPL-MCNC: 24 MG/DL
GLUCOSE SERPL-MCNC: 96 MG/DL (ref 70–110)
HCT VFR BLD AUTO: 44.9 % (ref 40–54)
HGB BLD-MCNC: 15.2 G/DL (ref 14–18)
IMM GRANULOCYTES # BLD AUTO: 0.03 K/UL (ref 0–0.04)
IMM GRANULOCYTES NFR BLD AUTO: 0.2 % (ref 0–0.5)
INR PPP: 1 (ref 0.8–1.2)
LYMPHOCYTES # BLD AUTO: 3.8 K/UL (ref 1–4.8)
LYMPHOCYTES NFR BLD: 29.2 % (ref 18–48)
MCH RBC QN AUTO: 31 PG (ref 27–31)
MCHC RBC AUTO-ENTMCNC: 33.9 G/DL (ref 32–36)
MCV RBC AUTO: 92 FL (ref 82–98)
MONOCYTES # BLD AUTO: 1 K/UL (ref 0.3–1)
MONOCYTES NFR BLD: 7.5 % (ref 4–15)
NEUTROPHILS # BLD AUTO: 8 K/UL (ref 1.8–7.7)
NEUTROPHILS NFR BLD: 62.4 % (ref 38–73)
NRBC BLD-RTO: 0 /100 WBC
PLATELET # BLD AUTO: 229 K/UL (ref 150–450)
PMV BLD AUTO: 10.2 FL (ref 9.2–12.9)
POTASSIUM SERPL-SCNC: 3.9 MMOL/L (ref 3.5–5.1)
PROT SERPL-MCNC: 7.8 G/DL (ref 6–8.4)
PROTHROMBIN TIME: 10.8 SEC (ref 9–12.5)
RBC # BLD AUTO: 4.9 M/UL (ref 4.6–6.2)
SARS-COV-2 RDRP RESP QL NAA+PROBE: NEGATIVE
SODIUM SERPL-SCNC: 142 MMOL/L (ref 136–145)
WBC # BLD AUTO: 12.86 K/UL (ref 3.9–12.7)

## 2021-04-11 PROCEDURE — 86703 HIV-1/HIV-2 1 RESULT ANTBDY: CPT | Performed by: EMERGENCY MEDICINE

## 2021-04-11 PROCEDURE — 63600175 PHARM REV CODE 636 W HCPCS: Performed by: STUDENT IN AN ORGANIZED HEALTH CARE EDUCATION/TRAINING PROGRAM

## 2021-04-11 PROCEDURE — 96374 THER/PROPH/DIAG INJ IV PUSH: CPT

## 2021-04-11 PROCEDURE — G0378 HOSPITAL OBSERVATION PER HR: HCPCS

## 2021-04-11 PROCEDURE — 85025 COMPLETE CBC W/AUTO DIFF WBC: CPT | Performed by: EMERGENCY MEDICINE

## 2021-04-11 PROCEDURE — 96361 HYDRATE IV INFUSION ADD-ON: CPT

## 2021-04-11 PROCEDURE — 82077 ASSAY SPEC XCP UR&BREATH IA: CPT | Performed by: EMERGENCY MEDICINE

## 2021-04-11 PROCEDURE — 99291 PR CRITICAL CARE, E/M 30-74 MINUTES: ICD-10-PCS | Mod: ,,, | Performed by: EMERGENCY MEDICINE

## 2021-04-11 PROCEDURE — 80053 COMPREHEN METABOLIC PANEL: CPT | Performed by: EMERGENCY MEDICINE

## 2021-04-11 PROCEDURE — 85610 PROTHROMBIN TIME: CPT | Performed by: EMERGENCY MEDICINE

## 2021-04-11 PROCEDURE — 63600175 PHARM REV CODE 636 W HCPCS: Performed by: EMERGENCY MEDICINE

## 2021-04-11 PROCEDURE — 99285 EMERGENCY DEPT VISIT HI MDM: CPT | Mod: 25

## 2021-04-11 PROCEDURE — 25000003 PHARM REV CODE 250: Performed by: EMERGENCY MEDICINE

## 2021-04-11 PROCEDURE — 86803 HEPATITIS C AB TEST: CPT | Performed by: EMERGENCY MEDICINE

## 2021-04-11 PROCEDURE — 99291 CRITICAL CARE FIRST HOUR: CPT | Mod: ,,, | Performed by: EMERGENCY MEDICINE

## 2021-04-11 PROCEDURE — 25500020 PHARM REV CODE 255: Performed by: EMERGENCY MEDICINE

## 2021-04-11 PROCEDURE — 63600175 PHARM REV CODE 636 W HCPCS: Performed by: PHYSICIAN ASSISTANT

## 2021-04-11 PROCEDURE — 96375 TX/PRO/DX INJ NEW DRUG ADDON: CPT | Performed by: EMERGENCY MEDICINE

## 2021-04-11 PROCEDURE — U0002 COVID-19 LAB TEST NON-CDC: HCPCS | Performed by: PHYSICIAN ASSISTANT

## 2021-04-11 PROCEDURE — 96375 TX/PRO/DX INJ NEW DRUG ADDON: CPT

## 2021-04-11 PROCEDURE — 86900 BLOOD TYPING SEROLOGIC ABO: CPT | Performed by: EMERGENCY MEDICINE

## 2021-04-11 RX ORDER — CEFAZOLIN SODIUM 1 G/3ML
1 INJECTION, POWDER, FOR SOLUTION INTRAMUSCULAR; INTRAVENOUS
Status: DISCONTINUED | OUTPATIENT
Start: 2021-04-12 | End: 2021-04-12 | Stop reason: HOSPADM

## 2021-04-11 RX ORDER — SODIUM CHLORIDE, SODIUM LACTATE, POTASSIUM CHLORIDE, CALCIUM CHLORIDE 600; 310; 30; 20 MG/100ML; MG/100ML; MG/100ML; MG/100ML
INJECTION, SOLUTION INTRAVENOUS CONTINUOUS
Status: DISCONTINUED | OUTPATIENT
Start: 2021-04-11 | End: 2021-04-12 | Stop reason: HOSPADM

## 2021-04-11 RX ORDER — SODIUM CHLORIDE 0.9 % (FLUSH) 0.9 %
10 SYRINGE (ML) INJECTION
Status: DISCONTINUED | OUTPATIENT
Start: 2021-04-11 | End: 2021-04-12 | Stop reason: HOSPADM

## 2021-04-11 RX ORDER — HYDROCODONE BITARTRATE AND ACETAMINOPHEN 5; 325 MG/1; MG/1
1 TABLET ORAL EVERY 4 HOURS PRN
Status: DISCONTINUED | OUTPATIENT
Start: 2021-04-11 | End: 2021-04-12 | Stop reason: HOSPADM

## 2021-04-11 RX ORDER — FENTANYL CITRATE 50 UG/ML
50 INJECTION, SOLUTION INTRAMUSCULAR; INTRAVENOUS
Status: COMPLETED | OUTPATIENT
Start: 2021-04-11 | End: 2021-04-11

## 2021-04-11 RX ORDER — HYDROMORPHONE HYDROCHLORIDE 1 MG/ML
0.5 INJECTION, SOLUTION INTRAMUSCULAR; INTRAVENOUS; SUBCUTANEOUS
Status: COMPLETED | OUTPATIENT
Start: 2021-04-11 | End: 2021-04-11

## 2021-04-11 RX ORDER — HYDROCODONE BITARTRATE AND ACETAMINOPHEN 10; 325 MG/1; MG/1
1 TABLET ORAL EVERY 4 HOURS PRN
Status: DISCONTINUED | OUTPATIENT
Start: 2021-04-11 | End: 2021-04-12 | Stop reason: HOSPADM

## 2021-04-11 RX ORDER — CEFAZOLIN SODIUM 1 G/3ML
2 INJECTION, POWDER, FOR SOLUTION INTRAMUSCULAR; INTRAVENOUS
Status: COMPLETED | OUTPATIENT
Start: 2021-04-11 | End: 2021-04-11

## 2021-04-11 RX ORDER — ACETAMINOPHEN 325 MG/1
650 TABLET ORAL EVERY 8 HOURS PRN
Status: DISCONTINUED | OUTPATIENT
Start: 2021-04-11 | End: 2021-04-12 | Stop reason: HOSPADM

## 2021-04-11 RX ADMIN — IOHEXOL 75 ML: 350 INJECTION, SOLUTION INTRAVENOUS at 08:04

## 2021-04-11 RX ADMIN — FENTANYL CITRATE 50 MCG: 50 INJECTION, SOLUTION INTRAMUSCULAR; INTRAVENOUS at 06:04

## 2021-04-11 RX ADMIN — SODIUM CHLORIDE 1000 ML: 0.9 INJECTION, SOLUTION INTRAVENOUS at 06:04

## 2021-04-11 RX ADMIN — HYDROMORPHONE HYDROCHLORIDE 0.5 MG: 1 INJECTION, SOLUTION INTRAMUSCULAR; INTRAVENOUS; SUBCUTANEOUS at 10:04

## 2021-04-11 RX ADMIN — SODIUM CHLORIDE, SODIUM LACTATE, POTASSIUM CHLORIDE, AND CALCIUM CHLORIDE: 600; 310; 30; 20 INJECTION, SOLUTION INTRAVENOUS at 10:04

## 2021-04-11 RX ADMIN — CEFAZOLIN 2 G: 330 INJECTION, POWDER, FOR SOLUTION INTRAMUSCULAR; INTRAVENOUS at 07:04

## 2021-04-12 VITALS
HEART RATE: 52 BPM | RESPIRATION RATE: 14 BRPM | TEMPERATURE: 97 F | BODY MASS INDEX: 20.3 KG/M2 | OXYGEN SATURATION: 99 % | DIASTOLIC BLOOD PRESSURE: 67 MMHG | HEIGHT: 71 IN | WEIGHT: 145 LBS | SYSTOLIC BLOOD PRESSURE: 106 MMHG

## 2021-04-12 PROBLEM — S71.131A GUN SHOT WOUND OF THIGH/FEMUR, RIGHT, INITIAL ENCOUNTER: Status: RESOLVED | Noted: 2021-04-11 | Resolved: 2021-04-12
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
## 2021-04-12 LAB
BASOPHILS # BLD AUTO: 0.04 K/UL (ref 0–0.2)
BASOPHILS NFR BLD: 0.5 % (ref 0–1.9)
DIFFERENTIAL METHOD: ABNORMAL
EOSINOPHIL # BLD AUTO: 0.1 K/UL (ref 0–0.5)
EOSINOPHIL NFR BLD: 1.1 % (ref 0–8)
ERYTHROCYTE [DISTWIDTH] IN BLOOD BY AUTOMATED COUNT: 12 % (ref 11.5–14.5)
HCT VFR BLD AUTO: 39 % (ref 40–54)
HCV AB SERPL QL IA: NEGATIVE
HGB BLD-MCNC: 13 G/DL (ref 14–18)
HIV 1+2 AB+HIV1 P24 AG SERPL QL IA: NEGATIVE
IMM GRANULOCYTES # BLD AUTO: 0.02 K/UL (ref 0–0.04)
IMM GRANULOCYTES NFR BLD AUTO: 0.2 % (ref 0–0.5)
LYMPHOCYTES # BLD AUTO: 2.3 K/UL (ref 1–4.8)
LYMPHOCYTES NFR BLD: 28.6 % (ref 18–48)
MCH RBC QN AUTO: 30.7 PG (ref 27–31)
MCHC RBC AUTO-ENTMCNC: 33.3 G/DL (ref 32–36)
MCV RBC AUTO: 92 FL (ref 82–98)
MONOCYTES # BLD AUTO: 0.9 K/UL (ref 0.3–1)
MONOCYTES NFR BLD: 11.4 % (ref 4–15)
NEUTROPHILS # BLD AUTO: 4.7 K/UL (ref 1.8–7.7)
NEUTROPHILS NFR BLD: 58.2 % (ref 38–73)
NRBC BLD-RTO: 0 /100 WBC
PLATELET # BLD AUTO: 174 K/UL (ref 150–450)
PMV BLD AUTO: 10.2 FL (ref 9.2–12.9)
RBC # BLD AUTO: 4.24 M/UL (ref 4.6–6.2)
WBC # BLD AUTO: 8.08 K/UL (ref 3.9–12.7)

## 2021-04-12 PROCEDURE — G0378 HOSPITAL OBSERVATION PER HR: HCPCS

## 2021-04-12 PROCEDURE — 36415 COLL VENOUS BLD VENIPUNCTURE: CPT | Performed by: STUDENT IN AN ORGANIZED HEALTH CARE EDUCATION/TRAINING PROGRAM

## 2021-04-12 PROCEDURE — 96376 TX/PRO/DX INJ SAME DRUG ADON: CPT | Performed by: EMERGENCY MEDICINE

## 2021-04-12 PROCEDURE — 63600175 PHARM REV CODE 636 W HCPCS: Performed by: STUDENT IN AN ORGANIZED HEALTH CARE EDUCATION/TRAINING PROGRAM

## 2021-04-12 PROCEDURE — 93005 ELECTROCARDIOGRAM TRACING: CPT

## 2021-04-12 PROCEDURE — 93010 EKG 12-LEAD: ICD-10-PCS | Mod: ,,, | Performed by: INTERNAL MEDICINE

## 2021-04-12 PROCEDURE — 25000003 PHARM REV CODE 250: Performed by: STUDENT IN AN ORGANIZED HEALTH CARE EDUCATION/TRAINING PROGRAM

## 2021-04-12 PROCEDURE — 85025 COMPLETE CBC W/AUTO DIFF WBC: CPT | Performed by: STUDENT IN AN ORGANIZED HEALTH CARE EDUCATION/TRAINING PROGRAM

## 2021-04-12 PROCEDURE — 93010 ELECTROCARDIOGRAM REPORT: CPT | Mod: ,,, | Performed by: INTERNAL MEDICINE

## 2021-04-12 RX ORDER — HYDROCODONE BITARTRATE AND ACETAMINOPHEN 10; 325 MG/1; MG/1
1 TABLET ORAL EVERY 4 HOURS PRN
Qty: 10 TABLET | Refills: 0 | Status: SHIPPED | OUTPATIENT
Start: 2021-04-12 | End: 2021-04-12

## 2021-04-12 RX ORDER — HYDROCODONE BITARTRATE AND ACETAMINOPHEN 10; 325 MG/1; MG/1
1 TABLET ORAL EVERY 4 HOURS PRN
Qty: 10 TABLET | Refills: 0 | Status: SHIPPED | OUTPATIENT
Start: 2021-04-12 | End: 2022-09-27 | Stop reason: ALTCHOICE

## 2021-04-12 RX ORDER — AMOXICILLIN AND CLAVULANATE POTASSIUM 875; 125 MG/1; MG/1
1 TABLET, FILM COATED ORAL EVERY 12 HOURS
Qty: 14 TABLET | Refills: 0 | Status: SHIPPED | OUTPATIENT
Start: 2021-04-12 | End: 2021-04-19

## 2021-04-12 RX ADMIN — CEFAZOLIN 1 G: 330 INJECTION, POWDER, FOR SOLUTION INTRAMUSCULAR; INTRAVENOUS at 05:04

## 2021-04-12 RX ADMIN — HYDROCODONE BITARTRATE AND ACETAMINOPHEN 1 TABLET: 10; 325 TABLET ORAL at 03:04

## 2022-09-27 ENCOUNTER — PATIENT MESSAGE (OUTPATIENT)
Dept: INTERNAL MEDICINE | Facility: CLINIC | Age: 32
End: 2022-09-27

## 2022-09-27 ENCOUNTER — LAB VISIT (OUTPATIENT)
Dept: LAB | Facility: OTHER | Age: 32
End: 2022-09-27
Attending: STUDENT IN AN ORGANIZED HEALTH CARE EDUCATION/TRAINING PROGRAM

## 2022-09-27 ENCOUNTER — OFFICE VISIT (OUTPATIENT)
Dept: INTERNAL MEDICINE | Facility: CLINIC | Age: 32
End: 2022-09-27

## 2022-09-27 VITALS
BODY MASS INDEX: 19.03 KG/M2 | SYSTOLIC BLOOD PRESSURE: 104 MMHG | DIASTOLIC BLOOD PRESSURE: 72 MMHG | OXYGEN SATURATION: 99 % | HEART RATE: 63 BPM | WEIGHT: 135.94 LBS | HEIGHT: 71 IN

## 2022-09-27 DIAGNOSIS — Z72.0 NICOTINE USE: ICD-10-CM

## 2022-09-27 DIAGNOSIS — Z00.00 HEALTH MAINTENANCE EXAMINATION: ICD-10-CM

## 2022-09-27 DIAGNOSIS — F41.9 ANXIETY AND DEPRESSION: Primary | ICD-10-CM

## 2022-09-27 DIAGNOSIS — Z11.4 SCREENING FOR HIV WITHOUT PRESENCE OF RISK FACTORS: ICD-10-CM

## 2022-09-27 DIAGNOSIS — Z23 NEED FOR VACCINATION: ICD-10-CM

## 2022-09-27 DIAGNOSIS — Z20.2 EXPOSURE TO SEXUALLY TRANSMITTED DISEASE (STD): ICD-10-CM

## 2022-09-27 DIAGNOSIS — Z13.6 SCREENING FOR CARDIOVASCULAR CONDITION: ICD-10-CM

## 2022-09-27 DIAGNOSIS — Z13.1 SCREENING FOR DIABETES MELLITUS: ICD-10-CM

## 2022-09-27 DIAGNOSIS — Z11.59 ENCOUNTER FOR HEPATITIS C SCREENING TEST FOR LOW RISK PATIENT: ICD-10-CM

## 2022-09-27 DIAGNOSIS — F32.A ANXIETY AND DEPRESSION: Primary | ICD-10-CM

## 2022-09-27 LAB
ALBUMIN SERPL BCP-MCNC: 4.5 G/DL (ref 3.5–5.2)
ALP SERPL-CCNC: 94 U/L (ref 55–135)
ALT SERPL W/O P-5'-P-CCNC: 20 U/L (ref 10–44)
ANION GAP SERPL CALC-SCNC: 9 MMOL/L (ref 8–16)
AST SERPL-CCNC: 17 U/L (ref 10–40)
BASOPHILS # BLD AUTO: 0.05 K/UL (ref 0–0.2)
BASOPHILS NFR BLD: 0.7 % (ref 0–1.9)
BILIRUB SERPL-MCNC: 1.2 MG/DL (ref 0.1–1)
BUN SERPL-MCNC: 14 MG/DL (ref 6–20)
CALCIUM SERPL-MCNC: 9.7 MG/DL (ref 8.7–10.5)
CHLORIDE SERPL-SCNC: 104 MMOL/L (ref 95–110)
CHOLEST SERPL-MCNC: 150 MG/DL (ref 120–199)
CHOLEST/HDLC SERPL: 3.1 {RATIO} (ref 2–5)
CO2 SERPL-SCNC: 27 MMOL/L (ref 23–29)
CREAT SERPL-MCNC: 1.2 MG/DL (ref 0.5–1.4)
DIFFERENTIAL METHOD: ABNORMAL
EOSINOPHIL # BLD AUTO: 0 K/UL (ref 0–0.5)
EOSINOPHIL NFR BLD: 0.3 % (ref 0–8)
ERYTHROCYTE [DISTWIDTH] IN BLOOD BY AUTOMATED COUNT: 11.8 % (ref 11.5–14.5)
EST. GFR  (NO RACE VARIABLE): >60 ML/MIN/1.73 M^2
ESTIMATED AVG GLUCOSE: 105 MG/DL (ref 68–131)
GLUCOSE SERPL-MCNC: 92 MG/DL (ref 70–110)
HBA1C MFR BLD: 5.3 % (ref 4–5.6)
HBV SURFACE AG SERPL QL IA: NORMAL
HCT VFR BLD AUTO: 47.6 % (ref 40–54)
HCV AB SERPL QL IA: NORMAL
HDLC SERPL-MCNC: 48 MG/DL (ref 40–75)
HDLC SERPL: 32 % (ref 20–50)
HGB BLD-MCNC: 16.6 G/DL (ref 14–18)
HIV 1+2 AB+HIV1 P24 AG SERPL QL IA: NORMAL
IMM GRANULOCYTES # BLD AUTO: 0.02 K/UL (ref 0–0.04)
IMM GRANULOCYTES NFR BLD AUTO: 0.3 % (ref 0–0.5)
LDLC SERPL CALC-MCNC: 85.2 MG/DL (ref 63–159)
LYMPHOCYTES # BLD AUTO: 1.3 K/UL (ref 1–4.8)
LYMPHOCYTES NFR BLD: 19.6 % (ref 18–48)
MCH RBC QN AUTO: 31.6 PG (ref 27–31)
MCHC RBC AUTO-ENTMCNC: 34.9 G/DL (ref 32–36)
MCV RBC AUTO: 91 FL (ref 82–98)
MONOCYTES # BLD AUTO: 0.5 K/UL (ref 0.3–1)
MONOCYTES NFR BLD: 6.8 % (ref 4–15)
NEUTROPHILS # BLD AUTO: 4.9 K/UL (ref 1.8–7.7)
NEUTROPHILS NFR BLD: 72.3 % (ref 38–73)
NONHDLC SERPL-MCNC: 102 MG/DL
NRBC BLD-RTO: 0 /100 WBC
PLATELET # BLD AUTO: 208 K/UL (ref 150–450)
PMV BLD AUTO: 9.9 FL (ref 9.2–12.9)
POTASSIUM SERPL-SCNC: 4.3 MMOL/L (ref 3.5–5.1)
PROT SERPL-MCNC: 7.7 G/DL (ref 6–8.4)
RBC # BLD AUTO: 5.26 M/UL (ref 4.6–6.2)
RPR SER QL: NORMAL
SODIUM SERPL-SCNC: 140 MMOL/L (ref 136–145)
TRIGL SERPL-MCNC: 84 MG/DL (ref 30–150)
TSH SERPL DL<=0.005 MIU/L-ACNC: 0.55 UIU/ML (ref 0.4–4)
WBC # BLD AUTO: 6.77 K/UL (ref 3.9–12.7)

## 2022-09-27 PROCEDURE — 99214 OFFICE O/P EST MOD 30 MIN: CPT | Mod: PBBFAC | Performed by: STUDENT IN AN ORGANIZED HEALTH CARE EDUCATION/TRAINING PROGRAM

## 2022-09-27 PROCEDURE — 85025 COMPLETE CBC W/AUTO DIFF WBC: CPT | Performed by: STUDENT IN AN ORGANIZED HEALTH CARE EDUCATION/TRAINING PROGRAM

## 2022-09-27 PROCEDURE — 99385 PR PREVENTIVE VISIT,NEW,18-39: ICD-10-PCS | Mod: S$PBB,1E,GY, | Performed by: STUDENT IN AN ORGANIZED HEALTH CARE EDUCATION/TRAINING PROGRAM

## 2022-09-27 PROCEDURE — 36415 COLL VENOUS BLD VENIPUNCTURE: CPT | Performed by: STUDENT IN AN ORGANIZED HEALTH CARE EDUCATION/TRAINING PROGRAM

## 2022-09-27 PROCEDURE — 86592 SYPHILIS TEST NON-TREP QUAL: CPT | Performed by: STUDENT IN AN ORGANIZED HEALTH CARE EDUCATION/TRAINING PROGRAM

## 2022-09-27 PROCEDURE — 83036 HEMOGLOBIN GLYCOSYLATED A1C: CPT | Performed by: STUDENT IN AN ORGANIZED HEALTH CARE EDUCATION/TRAINING PROGRAM

## 2022-09-27 PROCEDURE — 99385 PREV VISIT NEW AGE 18-39: CPT | Mod: S$PBB,1E,GY, | Performed by: STUDENT IN AN ORGANIZED HEALTH CARE EDUCATION/TRAINING PROGRAM

## 2022-09-27 PROCEDURE — 99213 PR OFFICE/OUTPT VISIT, EST, LEVL III, 20-29 MIN: ICD-10-PCS | Mod: S$PBB,25,, | Performed by: STUDENT IN AN ORGANIZED HEALTH CARE EDUCATION/TRAINING PROGRAM

## 2022-09-27 PROCEDURE — 99999 PR PBB SHADOW E&M-EST. PATIENT-LVL IV: CPT | Mod: PBBFAC,,, | Performed by: STUDENT IN AN ORGANIZED HEALTH CARE EDUCATION/TRAINING PROGRAM

## 2022-09-27 PROCEDURE — 87389 HIV-1 AG W/HIV-1&-2 AB AG IA: CPT | Performed by: STUDENT IN AN ORGANIZED HEALTH CARE EDUCATION/TRAINING PROGRAM

## 2022-09-27 PROCEDURE — 87340 HEPATITIS B SURFACE AG IA: CPT | Performed by: STUDENT IN AN ORGANIZED HEALTH CARE EDUCATION/TRAINING PROGRAM

## 2022-09-27 PROCEDURE — 86803 HEPATITIS C AB TEST: CPT | Performed by: STUDENT IN AN ORGANIZED HEALTH CARE EDUCATION/TRAINING PROGRAM

## 2022-09-27 PROCEDURE — 99213 OFFICE O/P EST LOW 20 MIN: CPT | Mod: S$PBB,25,, | Performed by: STUDENT IN AN ORGANIZED HEALTH CARE EDUCATION/TRAINING PROGRAM

## 2022-09-27 PROCEDURE — 84443 ASSAY THYROID STIM HORMONE: CPT | Performed by: STUDENT IN AN ORGANIZED HEALTH CARE EDUCATION/TRAINING PROGRAM

## 2022-09-27 PROCEDURE — 80053 COMPREHEN METABOLIC PANEL: CPT | Performed by: STUDENT IN AN ORGANIZED HEALTH CARE EDUCATION/TRAINING PROGRAM

## 2022-09-27 PROCEDURE — 99999 PR PBB SHADOW E&M-EST. PATIENT-LVL IV: ICD-10-PCS | Mod: PBBFAC,,, | Performed by: STUDENT IN AN ORGANIZED HEALTH CARE EDUCATION/TRAINING PROGRAM

## 2022-09-27 PROCEDURE — 80061 LIPID PANEL: CPT | Performed by: STUDENT IN AN ORGANIZED HEALTH CARE EDUCATION/TRAINING PROGRAM

## 2022-09-27 RX ORDER — IBUPROFEN 200 MG
1 TABLET ORAL DAILY
Qty: 21 PATCH | Refills: 0 | Status: SHIPPED | OUTPATIENT
Start: 2022-09-27 | End: 2022-10-18

## 2022-09-27 RX ORDER — NICOTINE 7MG/24HR
1 PATCH, TRANSDERMAL 24 HOURS TRANSDERMAL DAILY
Qty: 28 PATCH | Refills: 0 | Status: SHIPPED | OUTPATIENT
Start: 2022-10-18 | End: 2022-11-15

## 2022-09-27 NOTE — PATIENT INSTRUCTIONS
Call to schedule with [facility] or psychology: [phone] or [phone]    Therapy/Psychology:   You may also call your insurance to find providers who are covered in your area.    [facility]   Address: [facility] [place] [address]   Phone: [phone]   [email or web address]     [facility] [address], [address]   Phone: [phone]   You can email for an appointment at: [email or web address]     [facility]   [address], [facility], [address]   [email or web address]   Dr. Supriya Hdz, [phone] or [email or web address]   Dr. Dora Cuba, [phone] or [email or web address]     Malika Irving    LCSW (therapist) [phone]   [address] [place] [address]   Shira Barker   LCSW (therapist) [phone]   [address] [facility]   Jie Bridges LCSW (therapist) [phone]   [address] [facility]   CHE Colin         LCSW                    [phone]   Gino Rebollar [phone] (therapist) [address] [facility]   Alex Braker (therapist) [phone]  [address] Keira Marie (therapist) [phone] [address] [facility]     [facility] [phone]   [facility] [place] [address]     Online Therapist:     [email or web address]   [email or web address]     Free Guided Meditations   [email or web address]   [email or web address]   [email or web address]

## 2022-09-27 NOTE — PROGRESS NOTES
Subjective:       Patient ID: Kaiden Purcell is a 32 y.o. male.    Chief Complaint: Health maintenance examination [Z00.00]    Patient is new to me, here to establish care.    Health maintenance -   Denies family history of colorectal cancer.   Family history of cardiac disease.   Denies family history of prostate cancer.  UTD on Tdap vaccinations.  Due for COVID19, influenza vaccinations.  Drinks alcohol 6 times monthly, 1-2 drinks per sitting.  Denies drug use.  Not currently sexually active, female partners.  Agreeable to routine STI testing.  Completed HIV and Hep C screening.  Due for lipid screening.  Due for diabetes screening.  Endorses not eating many vegetables.  Eating chicken or fish for proteins.  Not currently exercising routinely.   Works as a .     Started smoking at age 21, at most 0.5 PPD. Currently smoking <0.5 PPD.  Interested in quitting smoking  Never previously tried smoking cessation agents  Interested in trying nicotine patches    Endorses fatigue, anhedonia, anxiety   Notes symptoms have been present since adolescence   Fluctuating in severity over that time  Never previously underwent counseling or prescribed medication  No prior psychiatric treatment  Endorses symptoms previously improved with physical activity such as playing basketball   20 points, severe anxiety disorder  24 points, severe depression  Denies SI     Review of Systems   Constitutional:  Negative for appetite change, chills, fatigue, fever and unexpected weight change.   Respiratory:  Negative for cough and shortness of breath.    Cardiovascular:  Negative for chest pain, palpitations and leg swelling.   Gastrointestinal:  Negative for abdominal pain, constipation, diarrhea, nausea and vomiting.   Genitourinary:  Negative for difficulty urinating and frequency.   Skin:  Negative for rash.   Neurological:  Negative for dizziness, syncope, weakness and headaches.       Current Outpatient Medications  "  Medication Instructions    nicotine (NICODERM CQ) 14 mg/24 hr 1 patch, Transdermal, Daily    [START ON 10/18/2022] nicotine (NICODERM CQ) 7 mg/24 hr 1 patch, Transdermal, Daily     Objective:      Vitals:    09/27/22 0804   BP: 104/72   Pulse: 63   SpO2: 99%   Weight: 61.7 kg (135 lb 14.6 oz)   Height: 5' 11" (1.803 m)   PainSc: 0-No pain     Body mass index is 18.96 kg/m².    Physical Exam  Vitals reviewed.   Constitutional:       General: He is not in acute distress.     Appearance: Normal appearance. He is not ill-appearing or diaphoretic.   HENT:      Head: Normocephalic and atraumatic.      Right Ear: Tympanic membrane, ear canal and external ear normal. There is no impacted cerumen.      Left Ear: Tympanic membrane, ear canal and external ear normal. There is no impacted cerumen.      Nose: Nose normal. No rhinorrhea.      Mouth/Throat:      Mouth: Mucous membranes are moist.      Pharynx: Oropharynx is clear. No oropharyngeal exudate or posterior oropharyngeal erythema.   Eyes:      General: No scleral icterus.        Right eye: No discharge.         Left eye: No discharge.      Conjunctiva/sclera: Conjunctivae normal.   Neck:      Thyroid: No thyromegaly or thyroid tenderness.      Trachea: Trachea normal.   Cardiovascular:      Rate and Rhythm: Normal rate and regular rhythm.      Heart sounds: Normal heart sounds. No murmur heard.    No friction rub. No gallop.   Pulmonary:      Effort: Pulmonary effort is normal. No respiratory distress.      Breath sounds: Normal breath sounds. No stridor. No wheezing, rhonchi or rales.   Abdominal:      General: Bowel sounds are normal. There is no distension.      Palpations: Abdomen is soft.      Tenderness: There is no abdominal tenderness. There is no guarding or rebound.   Musculoskeletal:         General: No swelling or deformity.      Cervical back: Neck supple.   Lymphadenopathy:      Head:      Right side of head: No submandibular or posterior auricular " adenopathy.      Left side of head: No submandibular or posterior auricular adenopathy.      Cervical: No cervical adenopathy.      Right cervical: No superficial, deep or posterior cervical adenopathy.     Left cervical: No superficial, deep or posterior cervical adenopathy.      Upper Body:      Right upper body: No supraclavicular adenopathy.      Left upper body: No supraclavicular adenopathy.   Skin:     General: Skin is warm and dry.   Neurological:      General: No focal deficit present.      Mental Status: He is alert. Mental status is at baseline.      Gait: Gait normal.   Psychiatric:         Mood and Affect: Mood normal.         Behavior: Behavior normal.       Assessment:       1. Health maintenance examination    2. Screening for cardiovascular condition    3. Screening for diabetes mellitus    4. Encounter for hepatitis C screening test for low risk patient    5. Screening for HIV without presence of risk factors    6. Exposure to sexually transmitted disease (STD)    7. Nicotine use    8. Anxiety and depression        Plan:       Anxiety and depression  Discussed treatment options including counseling and medication  Patient elects for counseling at this time, not currently interested in medication.  Recommend starting counseling, patient agreeable with plan  Referral placed to [facility] for counseling, contact card provided   List of local mental health resources provided   RTC in 4-6 weeks for follow up appointment  -     Ambulatory referral/consult to Primary Care Behavioral Health (Non-Opioids); Future  -     CBC Auto Differential; Future  -     Comprehensive Metabolic Panel; Future  -     TSH; Future    Nicotine use  Start nicotine patches  -     nicotine (NICODERM CQ) 14 mg/24 hr; Place 1 patch onto the skin once daily. for 21 days  -     nicotine (NICODERM CQ) 7 mg/24 hr; Place 1 patch onto the skin once daily.    Health maintenance examination  Need for vaccination  Discussed flu, COVID, and  pneumonia vaccinations, elects to defer at this time    Screening for cardiovascular condition  -     Lipid Panel; Future    Screening for diabetes mellitus  -     Hemoglobin A1C; Future    Encounter for hepatitis C screening test for low risk patient  -     Hepatitis C Antibody; Future    Screening for HIV without presence of risk factors  -     HIV 1/2 Ag/Ab (4th Gen); Future    Exposure to sexually transmitted disease (STD)  -     C. trachomatis/N. gonorrhoeae by AMP DNA [facility]; Urine; Future  -     Hepatitis B Surface Antigen; Future  -     RPR; Future      Rosa Maria De Los Santos MD  9/27/2022

## 2022-09-29 DIAGNOSIS — N28.9 RENAL DYSFUNCTION: Primary | ICD-10-CM

## 2022-10-17 ENCOUNTER — PATIENT MESSAGE (OUTPATIENT)
Dept: BEHAVIORAL HEALTH | Facility: OTHER | Age: 32
End: 2022-10-17

## 2023-06-20 ENCOUNTER — PATIENT MESSAGE (OUTPATIENT)
Dept: RESEARCH | Facility: HOSPITAL | Age: 33
End: 2023-06-20

## 2023-11-26 ENCOUNTER — HOSPITAL ENCOUNTER (EMERGENCY)
Facility: HOSPITAL | Age: 33
Discharge: HOME | End: 2023-11-26
Attending: EMERGENCY MEDICINE

## 2023-11-26 VITALS
HEIGHT: 71 IN | BODY MASS INDEX: 19.74 KG/M2 | HEART RATE: 95 BPM | WEIGHT: 141 LBS | TEMPERATURE: 99 F | DIASTOLIC BLOOD PRESSURE: 79 MMHG | RESPIRATION RATE: 18 BRPM | OXYGEN SATURATION: 99 % | SYSTOLIC BLOOD PRESSURE: 142 MMHG

## 2023-11-26 DIAGNOSIS — K04.7 DENTAL INFECTION: Primary | ICD-10-CM

## 2023-11-26 PROCEDURE — 99284 EMERGENCY DEPT VISIT MOD MDM: CPT

## 2023-11-26 PROCEDURE — 25000003 PHARM REV CODE 250: Performed by: PHYSICIAN ASSISTANT

## 2023-11-26 RX ORDER — IBUPROFEN 800 MG/1
800 TABLET ORAL 3 TIMES DAILY
Qty: 20 TABLET | Refills: 0 | Status: SHIPPED | OUTPATIENT
Start: 2023-11-26

## 2023-11-26 RX ORDER — HYDROCODONE BITARTRATE AND ACETAMINOPHEN 5; 325 MG/1; MG/1
1 TABLET ORAL EVERY 6 HOURS PRN
Qty: 5 TABLET | Refills: 0 | Status: SHIPPED | OUTPATIENT
Start: 2023-11-26

## 2023-11-26 RX ORDER — PENICILLIN V POTASSIUM 500 MG/1
500 TABLET, FILM COATED ORAL EVERY 6 HOURS
Qty: 20 TABLET | Refills: 0 | Status: SHIPPED | OUTPATIENT
Start: 2023-11-26 | End: 2023-12-01

## 2023-11-26 RX ORDER — IBUPROFEN 400 MG/1
800 TABLET ORAL
Status: COMPLETED | OUTPATIENT
Start: 2023-11-26 | End: 2023-11-26

## 2023-11-26 RX ORDER — PENICILLIN V POTASSIUM 500 MG/1
500 TABLET, FILM COATED ORAL
Status: COMPLETED | OUTPATIENT
Start: 2023-11-26 | End: 2023-11-26

## 2023-11-26 RX ADMIN — IBUPROFEN 800 MG: 400 TABLET, FILM COATED ORAL at 07:11

## 2023-11-26 RX ADMIN — PENICILLIN V POTASSIUM 500 MG: 500 TABLET, FILM COATED ORAL at 07:11

## 2023-11-26 NOTE — Clinical Note
"Kaiden Jerniganduarte Purcell was seen and treated in our emergency department on 11/26/2023.  He may return to work on 11/28/2023.       If you have any questions or concerns, please don't hesitate to call.      Zenobia Hayes RN    "

## 2023-11-26 NOTE — Clinical Note
"Kaiden Figueredo"Binh was seen and treated in our emergency department on 11/26/2023.  He may return to work on 11/27/2023.  Please excuse this patient for work as he has been dealing with dental pain from a dental abscess for the past 5 days     If you have any questions or concerns, please don't hesitate to call.      Edward Lloyd PA-C"

## 2023-11-27 NOTE — DISCHARGE INSTRUCTIONS

## 2023-11-27 NOTE — ED PROVIDER NOTES
Encounter Date: 11/26/2023       History     Chief Complaint   Patient presents with    Oral Swelling     Left upper dental pain and swelling x 1 week.      33-year-old male presenting with dental pain.  Left upper mouth.  Pain for the past 4-5 days.  States the pain is slightly better.  Reports feeling like he has an abscess.  No fevers or chills.  No trouble breathing or swallowing.      Review of patient's allergies indicates:  No Known Allergies  Past Medical History:   Diagnosis Date    Ankle fracture     bilateral    Gastroenteritis      Past Surgical History:   Procedure Laterality Date    NO PAST SURGERIES       Family History   Problem Relation Age of Onset    Hypertension Mother     Heart failure Mother     Heart attack Father 66    No Known Problems Sister     No Known Problems Sister     Asthma Brother     No Known Problems Brother     No Known Problems Brother     No Known Problems Brother     Heart attacks under age 50 Maternal Grandfather     Coronary artery disease Maternal Grandfather     Breast cancer Maternal Aunt     Diabetes Maternal Aunt     Hypertension Maternal Uncle     Diabetes Paternal Aunt     Stroke Neg Hx     Colon cancer Neg Hx     Prostate cancer Neg Hx      Social History     Tobacco Use    Smoking status: Every Day     Current packs/day: 0.50     Average packs/day: 0.5 packs/day for 6.0 years (3.0 ttl pk-yrs)     Types: Cigarettes    Smokeless tobacco: Never   Substance Use Topics    Alcohol use: Not Currently     Comment: once per week, none recently    Drug use: No     Comment: 5 cigarettes per day     Review of Systems   Constitutional:  Negative for fever.   HENT:  Positive for dental problem. Negative for sore throat.    Respiratory:  Negative for shortness of breath.    Cardiovascular:  Negative for chest pain.   Gastrointestinal:  Negative for nausea.   Genitourinary:  Negative for dysuria.   Musculoskeletal:  Negative for back pain.   Skin:  Negative for rash.    Neurological:  Negative for weakness.   Hematological:  Does not bruise/bleed easily.       Physical Exam     Initial Vitals [11/26/23 1919]   BP Pulse Resp Temp SpO2   (!) 142/79 95 18 98.7 °F (37.1 °C) 99 %      MAP       --         Physical Exam    Constitutional: Vital signs are normal. He appears well-developed and well-nourished.   HENT:   Head: Normocephalic and atraumatic.   Right Ear: Hearing normal.   Left Ear: Hearing normal.   There is no obvious facial swelling on the TM.  No trismus.  No redness.  Normal dentition.  There appears to be some swelling of the gum line, upper left side.  No palpable abscess.  No identifiable draining fluid collection   Eyes: Conjunctivae are normal.   Cardiovascular:  Normal rate and regular rhythm.           Abdominal: Abdomen is soft. Bowel sounds are normal.   Musculoskeletal:         General: Normal range of motion.     Neurological: He is alert and oriented to person, place, and time.   Skin: Skin is warm and intact.   Psychiatric: He has a normal mood and affect. His speech is normal and behavior is normal. Cognition and memory are normal.         ED Course   Procedures  Labs Reviewed   HIV 1 / 2 ANTIBODY   HEPATITIS C ANTIBODY          Imaging Results    None          Medications   ibuprofen tablet 800 mg (has no administration in time range)   penicillin v potassium tablet 500 mg (has no administration in time range)     Medical Decision Making  33-year-old male with what appears to be a dental abscess.  Considered but doubt peritonsillar abscess.  Doubt retropharyngeal abscess.  No sign of Santo's angina.  Patient has not had trismus.  He does not require OMFS services.  I will start the patient on antibiotics and pain medication.  Return precautions were given.  Stable for discharge.    Risk  Prescription drug management.                                   Clinical Impression:  Final diagnoses:  [K04.7] Dental infection (Primary)          ED Disposition  Condition    Discharge Stable          ED Prescriptions       Medication Sig Dispense Start Date End Date Auth. Provider    penicillin v potassium (VEETID) 500 MG tablet Take 1 tablet (500 mg total) by mouth every 6 (six) hours. for 5 days 20 tablet 11/26/2023 12/1/2023 Edward Lloyd PA-C    ibuprofen (ADVIL,MOTRIN) 800 MG tablet Take 1 tablet (800 mg total) by mouth 3 (three) times daily. 20 tablet 11/26/2023 -- Edward Lloyd PA-C    HYDROcodone-acetaminophen (NORCO) 5-325 mg per tablet Take 1 tablet by mouth every 6 (six) hours as needed for Pain. 5 tablet 11/26/2023 -- Edward Lloyd PA-C          Follow-up Information    None          Edward Lloyd PA-C  11/26/23 1935

## 2023-11-27 NOTE — ED NOTES
Kaiden Purcell, a 33 y.o. male presents to the ED w/ complaint of     Triage note:  Chief Complaint   Patient presents with    Oral Swelling     Left upper dental pain and swelling x 1 week.      Review of patient's allergies indicates:  No Known Allergies  Past Medical History:   Diagnosis Date    Ankle fracture     bilateral    Gastroenteritis    Patient identifiers for Kaiden Purcell checked and correct.    LOC: The patient is awake, alert and aware of environment with an appropriate affect, the patient is oriented x 4 and speaking appropriately.  APPEARANCE: Patient resting comfortably and in no acute distress, patient is clean and well groomed, patient's clothing is properly fastened.  SKIN: The skin is warm and dry, color consistent with ethnicity, patient has normal skin turgor and moist mucus membranes, skin intact, no breakdown or bruising noted.  MUSCULOSKELETAL: Patient moving all extremities well, no obvious swelling or deformities noted.  RESPIRATORY: Airway is open and patent, respirations are spontaneous and even, patient has a normal effort and rate.  CARDIAC: Patient has a normal rate and rhythm, no periphreal edema noted, capillary refill < 3 seconds. Normal +2 pedal pulses present.  ABDOMEN: Soft and non tender to palpation, no distention noted. Patient denies any nausea, vomiting, diarrhea, or constipation.   NEUROLOGIC: Eyes open spontaneously, PERRL, behavior appropriate to situation, follows commands, facial expression symmetrical, bilateral hand grasp equal and even, purposeful motor response noted, normal sensation in all extremities.   HEENT: dental pain

## 2025-01-27 ENCOUNTER — PATIENT OUTREACH (OUTPATIENT)
Dept: ADMINISTRATIVE | Facility: HOSPITAL | Age: 35
End: 2025-01-27

## 2025-04-17 ENCOUNTER — OFFICE VISIT (OUTPATIENT)
Dept: URGENT CARE | Facility: CLINIC | Age: 35
End: 2025-04-17
Payer: OTHER MISCELLANEOUS

## 2025-04-17 VITALS
HEART RATE: 74 BPM | DIASTOLIC BLOOD PRESSURE: 77 MMHG | OXYGEN SATURATION: 98 % | RESPIRATION RATE: 17 BRPM | SYSTOLIC BLOOD PRESSURE: 108 MMHG | HEIGHT: 71 IN | WEIGHT: 143 LBS | TEMPERATURE: 98 F | BODY MASS INDEX: 20.02 KG/M2

## 2025-04-17 DIAGNOSIS — S93.401A SPRAIN OF RIGHT ANKLE, UNSPECIFIED LIGAMENT, INITIAL ENCOUNTER: Primary | ICD-10-CM

## 2025-04-17 DIAGNOSIS — S63.502A LEFT WRIST SPRAIN, INITIAL ENCOUNTER: ICD-10-CM

## 2025-04-17 RX ORDER — IBUPROFEN 800 MG/1
800 TABLET ORAL EVERY 8 HOURS PRN
Qty: 30 TABLET | Refills: 1 | Status: SHIPPED | OUTPATIENT
Start: 2025-04-17 | End: 2026-04-17

## 2025-04-17 RX ORDER — TRAMADOL HYDROCHLORIDE 50 MG/1
TABLET ORAL
COMMUNITY

## 2025-04-17 RX ORDER — IBUPROFEN 800 MG/1
800 TABLET ORAL EVERY 8 HOURS PRN
Qty: 30 TABLET | Refills: 1 | Status: SHIPPED | OUTPATIENT
Start: 2025-04-17 | End: 2025-04-17 | Stop reason: SDUPTHER

## 2025-04-17 RX ORDER — IBUPROFEN 600 MG/1
600 TABLET ORAL EVERY 8 HOURS PRN
Qty: 30 TABLET | Refills: 0 | Status: SHIPPED | OUTPATIENT
Start: 2025-04-17

## 2025-04-17 NOTE — LETTER
April 17, 2025      [facility] [address] [address]  Phone: [phone]  Fax: [phone]       Patient: Kaiden Purcell   YOB: 1990  Date of Visit: 04/17/2025    To Whom It May Concern:    Rafiq Purcell  was at [facility] on 04/17/2025. The patient may return to work/school on 04/23/2025 with no restrictions. If you have any questions or concerns, or if I can be of further assistance, please do not hesitate to contact me.    Sincerely,    Luther Dowd MD

## 2025-04-17 NOTE — PROGRESS NOTES
"Subjective:      Patient ID: Kaiden Purcell is a 34 y.o. male.    Vitals:  height is 5' 11" (1.803 m) and weight is 64.9 kg (143 lb). His oral temperature is 97.6 °F (36.4 °C). His blood pressure is 108/77 and his pulse is 74. His respiration is 17 and oxygen saturation is 98%.     Chief Complaint: Injury    Patient's place of employment - [employer]  Patient's job title -   Date of injury - 04/16/25  Body part injured including left or right - rt ankle and lt wrist   Injury Mechanism - a fall   What they were doing when they got hurt - carrying packages and fell backward   What they did immediately after - notified supervisor   Pain scale right now - 7    ROS   Objective:     Physical Exam   Constitutional: He does not appear ill. No distress. normal  Neck: Neck supple.   Cardiovascular: Normal rate, regular rhythm, normal heart sounds and normal pulses.   Pulmonary/Chest: Effort normal and breath sounds normal.   Abdominal: Normal appearance.   Musculoskeletal:         General: Swelling (distal left radius and right medial malleolus) and tenderness (radial aspect left wrist) present. No signs of injury.   Neurological: He is alert.   Skin: Skin is warm.   Nursing note and vitals reviewed.      Assessment:     1. Sprain of right ankle, unspecified ligament, initial encounter    2. Left wrist sprain, initial encounter        Plan:       Sprain of right ankle, unspecified ligament, initial encounter  -     X-Ray Ankle Complete 3 View Right; Future; Expected date: 04/17/2025  -     X-Ray Wrist Complete 3 views Left; Future; Expected date: 04/17/2025  -     ibuprofen (ADVIL,MOTRIN) 600 MG tablet; Take 1 tablet (600 mg total) by mouth every 8 (eight) hours as needed for Pain (with food).  Dispense: 30 tablet; Refill: 0  -     Discontinue: ibuprofen (ADVIL,MOTRIN) 800 MG tablet; Take 1 tablet (800 mg total) by mouth every 8 (eight) hours as needed for Pain.  Dispense: 30 tablet; Refill: 1  -     ibuprofen " (ADVIL,MOTRIN) 800 MG tablet; Take 1 tablet (800 mg total) by mouth every 8 (eight) hours as needed for Pain.  Dispense: 30 tablet; Refill: 1    Left wrist sprain, initial encounter  -     ibuprofen (ADVIL,MOTRIN) 600 MG tablet; Take 1 tablet (600 mg total) by mouth every 8 (eight) hours as needed for Pain (with food).  Dispense: 30 tablet; Refill: 0  -     WRIST BRACE FOR HOME USE  -     Discontinue: ibuprofen (ADVIL,MOTRIN) 800 MG tablet; Take 1 tablet (800 mg total) by mouth every 8 (eight) hours as needed for Pain.  Dispense: 30 tablet; Refill: 1  -     ibuprofen (ADVIL,MOTRIN) 800 MG tablet; Take 1 tablet (800 mg total) by mouth every 8 (eight) hours as needed for Pain.  Dispense: 30 tablet; Refill: 1    RICE, NSAIDs and follow up with occupational medicine

## 2025-04-17 NOTE — LETTER
[facility] [address] [address]  Phone: [phone]  Fax: [phone]  [facility]: [id number]    Pt Name: Kaiden Purcell  Injury Date:  04/16/2025   Employee ID: [id number] Date of First Treatment: 04/17/2025   Company: [employer]      Appointment Time: 01:20 PM Arrived: 1:00 PM   Provider: Luther Dowd MD Time Out: 3:55 PM     Office Treatment:   1. Sprain of right ankle, unspecified ligament, initial encounter    2. Left wrist sprain, initial encounter      Medications Ordered This Encounter   Medications    ibuprofen (ADVIL,MOTRIN) 600 MG tablet            Must follow up with [facility] Provider for first workers comp visit.     Return Appointment: 4/23/2025 at 9:00 AM    MARINA

## 2025-04-23 ENCOUNTER — OFFICE VISIT (OUTPATIENT)
Dept: URGENT CARE | Facility: CLINIC | Age: 35
End: 2025-04-23
Payer: OTHER MISCELLANEOUS
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
## 2025-04-23 DIAGNOSIS — S93.401A SPRAIN OF RIGHT ANKLE, UNSPECIFIED LIGAMENT, INITIAL ENCOUNTER: Primary | ICD-10-CM

## 2025-04-23 DIAGNOSIS — S63.502A LEFT WRIST SPRAIN, INITIAL ENCOUNTER: ICD-10-CM

## 2025-04-23 NOTE — PROGRESS NOTES
Subjective:      Patient ID: Kaiden Purcell is a 34 y.o. male.    Chief Complaint: Ankle Injury (DOI 04/17/2025 Left ankle NEYMAR )    Patient's place of employment - [employer]  Patient's job title -   Date of injury - 04/16/25  Body part injured including left or right - rt ankle and lt wrist   Injury Mechanism - a fall   What they were doing when they got hurt - carrying packages and fell backward   What they did immediately after - notified supervisor   Pain scale right now - 5  NEYMAR    Provider note:   34-year-old right-hand dominant male  presents with injuries to left wrist and right ankle.  Patient states he was on duty when he was pulling a hand truck up a ramp and slipped.  Says he fell onto the left hand and twisted the right ankle in the process.  Patient was seen at urgent Care and had x-rays of the wrist and ankle that showed no acute bony abnormalities.  Patient has been taking ibuprofen and applying ice with improvement.  He has not returned to work since his injury. MEB    Ankle Injury   Pertinent negatives include no numbness.     Constitution: Positive for activity change.   HENT:  Negative for facial trauma.    Neck: Negative for neck pain.   Cardiovascular:  Negative for chest trauma.   Eyes:  Negative for eye trauma.   Respiratory:  Negative for shortness of breath.    Gastrointestinal:  Negative for abdominal trauma.   Musculoskeletal:  Positive for pain, trauma, joint pain, joint swelling and pain with walking.   Skin:  Negative for wound and bruising.   Neurological:  Negative for numbness and tingling.     Objective:     Physical Exam  Vitals and nursing note reviewed.   Constitutional:       General: He is not in acute distress.     Appearance: He is well-developed. He is not diaphoretic.   HENT:      Head: Normocephalic and atraumatic.      Right Ear: Hearing and external ear normal.      Left Ear: Hearing and external ear normal.      Nose: Nose normal. No nasal  deformity.   Eyes:      General: Lids are normal. No scleral icterus.     Conjunctiva/sclera: Conjunctivae normal.   Neck:      Trachea: Trachea normal.   Cardiovascular:      Pulses: Normal pulses.   Pulmonary:      Effort: Pulmonary effort is normal. No respiratory distress.      Breath sounds: No stridor.   Musculoskeletal:      Left wrist: Tenderness present. No swelling, deformity, snuff box tenderness or crepitus. Normal range of motion. Normal pulse.        Hands:       Cervical back: Normal range of motion.      Left ankle: No swelling, deformity or ecchymosis. Tenderness present over the ATF ligament. No lateral malleolus, medial malleolus, base of 5th metatarsal or proximal fibula tenderness. Normal range of motion. Anterior drawer test negative. Normal pulse.      Left Achilles Tendon: Normal. No tenderness or defects.   Skin:     General: Skin is warm and dry.      Capillary Refill: Capillary refill takes less than 2 seconds.   Neurological:      Mental Status: He is alert. He is not disoriented.      GCS: GCS eye subscore is 4. GCS verbal subscore is 5. GCS motor subscore is 6.      Sensory: No sensory deficit.   Psychiatric:         Attention and Perception: He is attentive.         Speech: Speech normal.         Behavior: Behavior normal.     X-Ray Ankle Complete 3 View Right  Result Date: 4/17/2025  EXAMINATION: XR ANKLE COMPLETE 3 VIEW RIGHT CLINICAL HISTORY: Injury, unspecified, initial encounter TECHNIQUE: AP, lateral, and oblique images of the right ankle were performed. COMPARISON: Right ankle series 11/06/2010 FINDINGS: Bones are well mineralized. Overall alignment is within normal limits.  Small rounded well corticated ossific body near the lateral malleolar tip without significant overlying soft tissue swelling or donor site identified, likely sequela of remote trauma or accessory ossicle.  Otherwise, no acute displaced fracture, dislocation or destructive osseous process. Joint spaces appear  relatively maintained.  No subcutaneous emphysema or radiopaque foreign body.     No acute displaced fracture-dislocation identified. Electronically signed by: Teofilo Fung MD Date:    04/17/2025 Time:    16:26    X-Ray Wrist Complete 3 views Left  Result Date: 4/17/2025  EXAMINATION: XR WRIST COMPLETE 3 VIEWS LEFT CLINICAL HISTORY: Injury, unspecified, initial encounter TECHNIQUE: PA, lateral, and oblique views of the left wrist were performed. COMPARISON: None FINDINGS: No fracture or dislocation.  No bone destruction identified     See above Electronically signed by: Kelvin Jarquin MD Date:    04/17/2025 Time:    15:29       Assessment:      1. Sprain of right ankle, unspecified ligament, initial encounter    2. Left wrist sprain, initial encounter    3. Work related injury      Plan:          Patient Instructions: Attention not to aggravate affected area (Apply Ice 2-3 times daily for 30 minutes.  Elevate the ankle often as needed for swelling.)   Restrictions: No lifting/pushing/pulling more than 50 lbs  Follow up in about 6 days (around 4/29/2025) for Reassessment.

## 2025-04-23 NOTE — LETTER
[facility] [address] [address]  Phone: [phone]  Fax: [phone]  [facility]: [id number]    Pt Name: Kaiden Purcell  Injury Date:     Employee ID:  Date of First Treatment: 04/23/2025   Company: [employer]      Appointment Time: 08:45 AM Arrived: 8:30 am    Provider: Shiraz Gaines PA-C Time Out:9:25 am      Office Treatment:   1. Sprain of right ankle, unspecified ligament, initial encounter    2. Left wrist sprain, initial encounter    3. Work related injury          Patient Instructions: Attention not to aggravate affected area (Apply Ice 2-3 times daily for 30 minutes.  Elevate the ankle often as needed for swelling.)    Restrictions: No lifting/pushing/pulling more than 50 lbs     Return Appointment: 4/29/2025 at 9:00 am Arsenio

## 2025-04-29 ENCOUNTER — OFFICE VISIT (OUTPATIENT)
Dept: URGENT CARE | Facility: CLINIC | Age: 35
End: 2025-04-29
Payer: OTHER MISCELLANEOUS

## 2025-04-29 VITALS
HEIGHT: 71 IN | DIASTOLIC BLOOD PRESSURE: 70 MMHG | RESPIRATION RATE: 18 BRPM | OXYGEN SATURATION: 100 % | SYSTOLIC BLOOD PRESSURE: 111 MMHG | WEIGHT: 141.13 LBS | BODY MASS INDEX: 19.76 KG/M2 | TEMPERATURE: 98 F | HEART RATE: 61 BPM
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
## 2025-04-29 DIAGNOSIS — S63.502A LEFT WRIST SPRAIN, INITIAL ENCOUNTER: ICD-10-CM

## 2025-04-29 DIAGNOSIS — Z02.6 ENCOUNTER RELATED TO WORKER'S COMPENSATION CLAIM: ICD-10-CM

## 2025-04-29 DIAGNOSIS — S93.401A SPRAIN OF RIGHT ANKLE, UNSPECIFIED LIGAMENT, INITIAL ENCOUNTER: Primary | ICD-10-CM

## 2025-04-29 PROCEDURE — 99213 OFFICE O/P EST LOW 20 MIN: CPT | Mod: S$GLB,,, | Performed by: PHYSICIAN ASSISTANT

## 2025-04-29 NOTE — LETTER
[facility] [address] [address]  Phone: [phone]  Fax: [phone]  [facility]: [phone]    Pt Name: Kaiden Purcell  Injury Date:     Employee ID: [id number] Date of First Treatment: 04/29/2025   Company: [employer]      Appointment Time: 08:45 AM Arrived: 9:00 AM   Provider: Shiraz Gaines PA-C Time Out: 9:52 AM     Office Treatment:   1. Sprain of right ankle, unspecified ligament, initial encounter    2. Encounter related to worker's compensation claim    3. Left wrist sprain, initial encounter    4. Work related injury               Restrictions: Regular Duty, Discharged from Occupational Health     Return Appointment: Return if symptoms fail to improve or worsen.    MARINA

## 2025-04-29 NOTE — PROGRESS NOTES
Subjective:      Patient ID: Kaiden Purcell is a 34 y.o. male.    Chief Complaint: Ankle Pain (RT ANKLE, LT WRIST )    Patient's place of employment - [employer]  Patient's job title -    Date of Injury - 4/16/2025  Body part injured - RT ANKLE, LT WRIST   Current work status per last visit - L[employer] D[employer]Y. NO LIGHT DUTY AVAILABLE  Improved, same, or worse - IMPROVED   Pain Scale right now (1-10) -  2/10    KSD    Provider note:   Patient presents for follow-up right ankle and left wrist injuries.  He reports the left wrist pain has resolved.  Reports minimal pain in the right ankle.  Says he has had much improvement with ibuprofen, ice and elevation.  Patient bought an ankle brace to use which helps. MEB    Ankle Pain       Constitution: Positive for activity change.   Musculoskeletal:  Positive for pain, joint pain and joint swelling.     Objective:     Physical Exam  Vitals and nursing note reviewed.   Constitutional:       General: He is not in acute distress.     Appearance: He is well-developed. He is not diaphoretic.   HENT:      Head: Normocephalic and atraumatic.      Right Ear: Hearing and external ear normal.      Left Ear: Hearing and external ear normal.      Nose: Nose normal. No nasal deformity.   Eyes:      General: Lids are normal. No scleral icterus.     Conjunctiva/sclera: Conjunctivae normal.   Neck:      Trachea: Trachea normal.   Cardiovascular:      Pulses: Normal pulses.   Pulmonary:      Effort: Pulmonary effort is normal. No respiratory distress.      Breath sounds: No stridor.   Musculoskeletal:      Left wrist: Normal. No swelling, deformity or tenderness. Normal range of motion.      Cervical back: Normal range of motion.      Right ankle: Swelling present. No deformity. Tenderness present over the ATF ligament. No lateral malleolus, medial malleolus, posterior TF ligament or proximal fibula tenderness. Normal range of motion. Anterior drawer test negative. Normal pulse.       Right Achilles Tendon: Normal. No tenderness or defects.   Skin:     General: Skin is warm and dry.      Capillary Refill: Capillary refill takes less than 2 seconds.   Neurological:      Mental Status: He is alert. He is not disoriented.      GCS: GCS eye subscore is 4. GCS verbal subscore is 5. GCS motor subscore is 6.      Sensory: No sensory deficit.   Psychiatric:         Attention and Perception: He is attentive.         Speech: Speech normal.         Behavior: Behavior normal.        Assessment:      1. Sprain of right ankle, unspecified ligament, initial encounter    2. Encounter related to worker's compensation claim    3. Left wrist sprain, initial encounter    4. Work related injury      Plan:     Patient has reached MMI and will be discharged at this time.  He may return to clinic as needed.         Restrictions: Regular Duty, Discharged from Occupational Health  Follow up if symptoms worsen or fail to improve.